# Patient Record
Sex: FEMALE | Race: WHITE | NOT HISPANIC OR LATINO | Employment: FULL TIME | URBAN - METROPOLITAN AREA
[De-identification: names, ages, dates, MRNs, and addresses within clinical notes are randomized per-mention and may not be internally consistent; named-entity substitution may affect disease eponyms.]

---

## 2019-06-14 LAB
PAP DATE - QUEST: NORMAL
PAP: NORMAL

## 2019-07-08 LAB — HIV 1+2 AB+HIV1 P24 AG SERPL QL IA: NONREACTIVE

## 2021-06-26 NOTE — PROGRESS NOTES
ASSESSMENT AND PLAN:     COUNSELING:   Reviewed preventive health counseling, as reflected in patient instructions       Regular exercise       Healthy diet/nutrition       Consider Hep C screening for all patients one time for ages 18-79 years       HIV screeninx in teen years, 1x in adult years, and at intervals if high risk      (Z01.419) Well woman exam  (primary encounter diagnosis)  Comment: Age appropriate screening and preventive services provided.   Plan:     (Z30.431) Intrauterine device surveillance  Comment: Satisfied with Harriet  Plan:     (F41.9) Anxiety  Comment: Well controlled, stable. Refilled medications.   Plan: sertraline (ZOLOFT) 100 MG tablet, traZODone         (DESYREL) 50 MG tablet, propranolol (INDERAL)         10 MG tablet          (L70.0) Acne vulgaris  Comment: Not at goal. Has tolerated spironolactone and tretinoin 0.05% topical. Had normal chemistries in October on spironolactone 50mg. Has IUD for contraception. Increase spironolactone to 100mg daily and tretinoin to 0.1% if skin will tolerate.   Plan: spironolactone (ALDACTONE) 100 MG tablet,         tretinoin (RETIN-A) 0.1 % external cream          Mann Chau MD  [facility]  2021, 5:07 PM      SUBJECTIVE:   Naomy is a 26 year old female who presents to clinic today to establish care and for annual wellness exam.    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 No dizziness/lightheadedness  - tretinoin 0.05% at night, BP daily, clindamycin gel daily    -  10/13/20 Creatinine 0.98, BUN 17, Na 139, K 3.8     # Health Maintenance  - HIV Screenin19 nonreactive  - Hep C Screening: do today  - BP:   BP Readings from Last 3 Encounters:   21 104/70   - Cholesterol: do with next labs, has never had checked  - Diabetes Screening: random glucose 67 on 10/13/20  - Feels safe at home, denies verbal/physical/emotional abuse in past year: yes  - Last Pap: 19 NIL  - Exercise: runs, bikes, lift weights, walks regularly, 4 days a week does something    Today's PHQ-2 Score:   PHQ-2 (  [employer]) 2021   Q1: Little interest or pleasure in doing things 0   Q2: Feeling down, depressed or hopeless 0   PHQ-2 Score 0     Review of Systems:   Constitutional - no fevers, chills, night sweats, unintentional weight loss/gain   Eyes - no vision concerns   Ears/Nose/Throat - no hearing concerns, no dysphagia/odynophagia   Cardiovascular - no chest pain, palpitations   Pulmonary - no shortness of breath, wheezing, coughing   GI - no abdominal pain, constipation, diarrhea, nausea, vomiting    - no dysuria, polyuria, hematuria   Musculoskeletal - no joint or muscle pain  Integument - as above   Neuro - no weakness, numbness, paresthesia   Heme - no easy bruising/bleeding   Endocrine - no polyuria, weight loss/gain, dry skin, excessive sweating, hair loss   Psychiatric - no feelings of depressed mood or anhedonia in past 2 weeks   Allergic/Immunologic - no history of anaphylaxis, no history of recurrent infections    Past Medical History:   Diagnosis Date     Acne      Anxiety      History of heavy periods      History reviewed. No pertinent surgical history.  Family History   Problem Relation Age of Onset     No Known Problems Mother      No Known Problems Father      No Known Problems Sister      No Known Problems Brother      Cerebrovascular Disease Maternal Grandmother 70     Lymphoma Maternal Grandfather         NHL     No Known Problems Paternal Grandmother      ALS  "Paternal Grandfather 65     Diabetes No family hx of      Heart Disease No family hx of      Breast Cancer No family hx of      Colon Cancer No family hx of      Ovarian Cancer No family hx of      Social History     Tobacco Use     Smoking status: Never Smoker     Smokeless tobacco: Never Used   Substance Use Topics     Alcohol use: Yes     Frequency: 2-4 times a month     Drinks per session: 1 or 2     Binge frequency: Never     Drug use: Never     Social History     Social History Narrative    Lives with  who is ENT resident    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      I have reviewed the patient's past medical, surgical, family, and social history.     OBJECTIVE:   /70 (BP Location: Left arm, Patient Position: Sitting, Cuff Size: Adult Regular)   Pulse 81   Temp 96.3  F (35.7  C) (Skin)   Resp 13   Ht 1.76 m (5' 9.29\")   Wt 58.6 kg (129 lb 4 oz)   LMP  (LMP Unknown)   SpO2 97%   Breastfeeding No   BMI 18.93 kg/m      Constitutional: well-appearing, appears stated age  Eyes: conjunctivae without erythema, sclera anicteric. Pupils equal, round, and reactive to light.   ENT: oropharynx clear, TM grey bilateral  Cardiac: regular rate and rhythm, normal S1/S2, no murmur/rubs/gallops  Respiratory: lungs clear to auscultation bilaterally, normal work of breathing, no wheezes/crackles  GI: abdomen soft, non-tender, non-distended  Extremities: warm and well perfused, radial pulses 2+ and equal, cap refill brisk.  Lymph: no cervical or supraclavicular lymphadenopathy  Skin: no rashes, lesions, or " wounds  Psych: affect is full and appropriate, speech is fluent and non-pressured

## 2021-07-02 ENCOUNTER — OFFICE VISIT (OUTPATIENT)
Dept: FAMILY MEDICINE | Facility: CLINIC | Age: 26
End: 2021-07-02
Payer: COMMERCIAL

## 2021-07-02 VITALS
BODY MASS INDEX: 19.14 KG/M2 | RESPIRATION RATE: 13 BRPM | OXYGEN SATURATION: 97 % | SYSTOLIC BLOOD PRESSURE: 104 MMHG | WEIGHT: 129.25 LBS | DIASTOLIC BLOOD PRESSURE: 70 MMHG | HEIGHT: 69 IN | TEMPERATURE: 96.3 F | HEART RATE: 81 BPM

## 2021-07-02 DIAGNOSIS — L70.0 ACNE VULGARIS: ICD-10-CM

## 2021-07-02 DIAGNOSIS — Z01.419 WELL WOMAN EXAM: Primary | ICD-10-CM

## 2021-07-02 DIAGNOSIS — Z30.431 INTRAUTERINE DEVICE SURVEILLANCE: ICD-10-CM

## 2021-07-02 DIAGNOSIS — F41.9 ANXIETY: ICD-10-CM

## 2021-07-02 PROBLEM — H52.203 MYOPIA OF BOTH EYES WITH ASTIGMATISM: Chronic | Status: ACTIVE | Noted: 2019-08-28

## 2021-07-02 PROBLEM — H52.13 MYOPIA OF BOTH EYES WITH ASTIGMATISM: Status: ACTIVE | Noted: 2019-08-28

## 2021-07-02 PROBLEM — H50.21 HYPERTROPIA OF RIGHT EYE: Status: ACTIVE | Noted: 2019-08-28

## 2021-07-02 PROBLEM — H52.203 MYOPIA OF BOTH EYES WITH ASTIGMATISM: Status: ACTIVE | Noted: 2019-08-28

## 2021-07-02 PROBLEM — H50.21 HYPERTROPIA OF RIGHT EYE: Chronic | Status: ACTIVE | Noted: 2019-08-28

## 2021-07-02 PROBLEM — H52.13 MYOPIA OF BOTH EYES WITH ASTIGMATISM: Chronic | Status: ACTIVE | Noted: 2019-08-28

## 2021-07-02 RX ORDER — PROPRANOLOL HYDROCHLORIDE 10 MG/1
10 TABLET ORAL PRN
COMMUNITY
Start: 2020-09-25 | End: 2021-07-02

## 2021-07-02 RX ORDER — SERTRALINE HYDROCHLORIDE 100 MG/1
100 TABLET, FILM COATED ORAL DAILY
COMMUNITY
Start: 2021-01-28 | End: 2021-07-02

## 2021-07-02 RX ORDER — TRAZODONE HYDROCHLORIDE 50 MG/1
25 TABLET, FILM COATED ORAL AT BEDTIME
COMMUNITY
Start: 2020-09-25 | End: 2021-07-02

## 2021-07-02 RX ORDER — PROPRANOLOL HYDROCHLORIDE 10 MG/1
10 TABLET ORAL PRN
Qty: 30 TABLET | Refills: 11 | Status: SHIPPED | OUTPATIENT
Start: 2021-07-02 | End: 2022-10-31

## 2021-07-02 RX ORDER — TRETINOIN 1 MG/G
CREAM TOPICAL AT BEDTIME
Qty: 45 G | Refills: 11 | Status: SHIPPED | OUTPATIENT
Start: 2021-07-02 | End: 2022-01-25

## 2021-07-02 RX ORDER — SPIRONOLACTONE 100 MG/1
100 TABLET, FILM COATED ORAL DAILY
Qty: 90 TABLET | Refills: 3 | Status: SHIPPED | OUTPATIENT
Start: 2021-07-02 | End: 2022-07-22

## 2021-07-02 RX ORDER — SPIRONOLACTONE 50 MG/1
50 TABLET, FILM COATED ORAL DAILY
COMMUNITY
Start: 2021-01-28 | End: 2021-07-02

## 2021-07-02 RX ORDER — TRETINOIN 0.5 MG/G
CREAM TOPICAL AT BEDTIME
COMMUNITY
Start: 2021-01-04 | End: 2021-07-02

## 2021-07-02 RX ORDER — TRAZODONE HYDROCHLORIDE 50 MG/1
25 TABLET, FILM COATED ORAL AT BEDTIME
Qty: 45 TABLET | Refills: 3 | Status: SHIPPED | OUTPATIENT
Start: 2021-07-02 | End: 2022-07-06

## 2021-07-02 RX ORDER — CLINDAMYCIN PHOSPHATE 10 MG/G
GEL TOPICAL DAILY
COMMUNITY
Start: 2021-01-04 | End: 2022-07-22

## 2021-07-02 RX ORDER — SERTRALINE HYDROCHLORIDE 100 MG/1
100 TABLET, FILM COATED ORAL DAILY
Qty: 90 TABLET | Refills: 3 | Status: SHIPPED | OUTPATIENT
Start: 2021-07-02 | End: 2022-07-06

## 2021-07-02 SDOH — HEALTH STABILITY: MENTAL HEALTH: HOW OFTEN DO YOU HAVE 6 OR MORE DRINKS ON ONE OCCASION?: NEVER

## 2021-07-02 SDOH — HEALTH STABILITY: MENTAL HEALTH: HOW MANY STANDARD DRINKS CONTAINING ALCOHOL DO YOU HAVE ON A TYPICAL DAY?: 1 OR 2

## 2021-07-02 SDOH — HEALTH STABILITY: MENTAL HEALTH: HOW OFTEN DO YOU HAVE A DRINK CONTAINING ALCOHOL?: 2-4 TIMES A MONTH

## 2021-07-02 ASSESSMENT — ANXIETY QUESTIONNAIRES
7. FEELING AFRAID AS IF SOMETHING AWFUL MIGHT HAPPEN: NOT AT ALL
1. FEELING NERVOUS, ANXIOUS, OR ON EDGE: NOT AT ALL
GAD7 TOTAL SCORE: 0
6. BECOMING EASILY ANNOYED OR IRRITABLE: NOT AT ALL
4. TROUBLE RELAXING: NOT AT ALL
3. WORRYING TOO MUCH ABOUT DIFFERENT THINGS: NOT AT ALL
5. BEING SO RESTLESS THAT IT IS HARD TO SIT STILL: NOT AT ALL
2. NOT BEING ABLE TO STOP OR CONTROL WORRYING: NOT AT ALL

## 2021-07-02 ASSESSMENT — PATIENT HEALTH QUESTIONNAIRE - PHQ9: SUM OF ALL RESPONSES TO PHQ QUESTIONS 1-9: 0

## 2021-07-02 ASSESSMENT — MIFFLIN-ST. JEOR: SCORE: 1385.26

## 2021-07-02 NOTE — NURSING NOTE
"26 year old  Chief Complaint   Patient presents with     Anxiety     Physical       Blood pressure 104/70, pulse 81, temperature 96.3  F (35.7  C), temperature source Skin, resp. rate 13, height 1.76 m (5' 9.29\"), weight 58.6 kg (129 lb 4 oz), SpO2 97 %, not currently breastfeeding. Body mass index is 18.93 kg/m .  There is no problem list on file for this patient.      Wt Readings from Last 2 Encounters:   07/02/21 58.6 kg (129 lb 4 oz)     BP Readings from Last 3 Encounters:   07/02/21 104/70         Current Outpatient Medications   Medication     clindamycin (CLINDAMAX) 1 % external gel     levonorgestrel (DEBORAH) 13.5 MG IUD     propranolol (INDERAL) 10 MG tablet     sertraline (ZOLOFT) 100 MG tablet     spironolactone (ALDACTONE) 50 MG tablet     traZODone (DESYREL) 50 MG tablet     tretinoin (RETIN-A) 0.05 % external cream     No current facility-administered medications for this visit.        Social History     Tobacco Use     Smoking status: Never Smoker     Smokeless tobacco: Never Used   Substance Use Topics     Alcohol use: Yes     Frequency: 2-4 times a month     Drinks per session: 1 or 2     Binge frequency: Never     Drug use: Never       Health Maintenance Due   Topic Date Due     PREVENTIVE CARE VISIT  Never done     ADVANCE CARE PLANNING  Never done     HIV SCREENING  Never done     HEPATITIS C SCREENING  Never done     PAP  Never done       No results found for: PAP      July 2, 2021 3:55 PM    "

## 2021-07-03 ASSESSMENT — ANXIETY QUESTIONNAIRES: GAD7 TOTAL SCORE: 0

## 2021-10-11 ENCOUNTER — HEALTH MAINTENANCE LETTER (OUTPATIENT)
Age: 26
End: 2021-10-11

## 2021-12-07 ENCOUNTER — OFFICE VISIT (OUTPATIENT)
Dept: OBGYN | Facility: CLINIC | Age: 26
End: 2021-12-07
Attending: ADVANCED PRACTICE MIDWIFE
Payer: COMMERCIAL

## 2021-12-07 VITALS
BODY MASS INDEX: 19.92 KG/M2 | SYSTOLIC BLOOD PRESSURE: 109 MMHG | HEART RATE: 75 BPM | HEIGHT: 69 IN | WEIGHT: 134.5 LBS | DIASTOLIC BLOOD PRESSURE: 72 MMHG

## 2021-12-07 DIAGNOSIS — Z30.432 ENCOUNTER FOR IUD REMOVAL: ICD-10-CM

## 2021-12-07 DIAGNOSIS — Z30.432 ENCOUNTER FOR REMOVAL OF INTRAUTERINE CONTRACEPTIVE DEVICE: ICD-10-CM

## 2021-12-07 DIAGNOSIS — Z00.00 ANNUAL PHYSICAL EXAM: Primary | ICD-10-CM

## 2021-12-07 DIAGNOSIS — Z30.011 ENCOUNTER FOR INITIAL PRESCRIPTION OF CONTRACEPTIVE PILLS: ICD-10-CM

## 2021-12-07 PROBLEM — Z30.431 INTRAUTERINE DEVICE SURVEILLANCE: Chronic | Status: RESOLVED | Noted: 2021-07-02 | Resolved: 2021-12-07

## 2021-12-07 PROCEDURE — G0101 CA SCREEN;PELVIC/BREAST EXAM: HCPCS | Performed by: ADVANCED PRACTICE MIDWIFE

## 2021-12-07 PROCEDURE — 58301 REMOVE INTRAUTERINE DEVICE: CPT | Performed by: ADVANCED PRACTICE MIDWIFE

## 2021-12-07 PROCEDURE — G0463 HOSPITAL OUTPT CLINIC VISIT: HCPCS

## 2021-12-07 RX ORDER — NORETHINDRONE ACETATE AND ETHINYL ESTRADIOL .03; 1.5 MG/1; MG/1
1 TABLET ORAL DAILY
Qty: 112 TABLET | Refills: 4 | Status: SHIPPED | OUTPATIENT
Start: 2021-12-07 | End: 2023-01-16

## 2021-12-07 ASSESSMENT — ANXIETY QUESTIONNAIRES
6. BECOMING EASILY ANNOYED OR IRRITABLE: NOT AT ALL
5. BEING SO RESTLESS THAT IT IS HARD TO SIT STILL: NOT AT ALL
3. WORRYING TOO MUCH ABOUT DIFFERENT THINGS: NOT AT ALL
1. FEELING NERVOUS, ANXIOUS, OR ON EDGE: NOT AT ALL
2. NOT BEING ABLE TO STOP OR CONTROL WORRYING: NOT AT ALL
7. FEELING AFRAID AS IF SOMETHING AWFUL MIGHT HAPPEN: NOT AT ALL
GAD7 TOTAL SCORE: 0

## 2021-12-07 ASSESSMENT — MIFFLIN-ST. JEOR: SCORE: 1404.47

## 2021-12-07 ASSESSMENT — PATIENT HEALTH QUESTIONNAIRE - PHQ9: 5. POOR APPETITE OR OVEREATING: NOT AT ALL

## 2021-12-07 NOTE — PROGRESS NOTES
CC/HPI:   Naomy Mark is a 26 year old female  who presents today for her annual exam. She is currently using Sklya IUD  and would like to change this method of birth control to a combined oral contraceptive. Has had Harriet IUD for 2 years and Mirena IUD for five years prior. Before using IUD contraception she used combined oral contraceptives. She would like to switch back to using combined oral contraceptives to help with acne, states that her acne has gotten worse with progesterone only contraception. Would like IUD removed today and be started on combined oral contraception. Contraindications discussed with patient, no contraindications noted for starting combined oral contraception.      Has had regular menstrual cycles with Harriet IUD with 3-4 days of light bleeding. Reports having had heavy menstrual periods before starting hormonal contraception.     Sexually active, in a monogamous relationship with her . Declines STI testing today. Planning on conceiving in the next in 1-2 years. Encouraged to start taking prenatal vitamin.       Reports lump on right breast, would like to have it examined today. Has not noticed any change in size.     Reports no new concerns or changes with mood.     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     HISTORIES:  Patient Active Problem List   Diagnosis     Anxiety     Hypertropia of right eye     Myopia of both eyes with astigmatism     Acne vulgaris     Past Medical History:   Diagnosis Date     Acne      Anxiety      History of heavy periods      History reviewed. No pertinent surgical history.  Current Outpatient Medications   Medication Sig Dispense Refill     clindamycin (CLINDAMAX) 1 % external gel Apply topically daily       norethindrone-ethinyl estradiol (MICROGESTIN 1.5/30) 1.5-30 MG-MCG tablet Take 1 tablet by mouth daily 112 tablet 4     sertraline (ZOLOFT) 100 MG tablet Take 1 tablet (100 mg) by mouth daily 90  tablet 3     spironolactone (ALDACTONE) 100 MG tablet Take 1 tablet (100 mg) by mouth daily 90 tablet 3     traZODone (DESYREL) 50 MG tablet Take 0.5 tablets (25 mg) by mouth At Bedtime 45 tablet 3     tretinoin (RETIN-A) 0.1 % external cream Apply topically At Bedtime 45 g 11     propranolol (INDERAL) 10 MG tablet Take 1 tablet (10 mg) by mouth as needed (anxiety) (Patient not taking: Reported on 12/7/2021) 30 tablet 11     No Known Allergies  Social History     Socioeconomic History     Marital status:      Spouse name: Not on file     Number of children: Not on file     Years of education: Not on file     Highest education level: Not on file   Occupational History     Not on file   Tobacco Use     Smoking status: Never Smoker     Smokeless tobacco: Never Used   Substance and Sexual Activity     Alcohol use: Yes     Drug use: Never     Sexual activity: Yes     Partners: Male     Birth control/protection: I.U.D.     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     Social Determinants of Health     Financial Resource Strain: Not on file   Food Insecurity: Not on file   Transportation Needs: Not on file   Physical Activity: Not on file   Stress: Not on file   Social Connections: Not on file   Intimate Partner Violence: Not on file   Housing Stability: Not on file     Family History   Problem Relation Age of Onset     No Known Problems Mother      No Known Problems Father      No Known Problems Sister      No Known Problems Brother      Cerebrovascular Disease Maternal Grandmother 70     Lymphoma Maternal Grandfather         NHL     No Known Problems Paternal Grandmother      ALS Paternal Grandfather 65     Diabetes No family hx of      Heart Disease No family hx of      Breast Cancer No family hx of      Colon Cancer No family hx of      Ovarian Cancer No family hx  "of           Gyn Hx:   IUD   Menses:   Has had regular menstrual cycles with Harriet IUD with 3-4 days of light bleeding. Reports having had heavy menstrual periods before starting hormonal contraception.       Review Of Systems:  CONSTITUTIONAL: NEGATIVE for fever, chills  EYES: NEGATIVE for vision changes   RESP: NEGATIVE for significant cough or SOB  CV: NEGATIVE for chest pain, palpitations   GI: NEGATIVE for nausea, abdominal pain, heartburn, or change in bowel habits  : NEGATIVE for frequency, dysuria, or hematuria  MUSCULOSKELETAL: NEGATIVE for significant arthralgias or myalgia  NEURO: NEGATIVE for weakness, dizziness or paresthesias or headache    EXAM:  /72 (BP Location: Left arm, Patient Position: Chair)   Pulse 75   Ht 1.753 m (5' 9\")   Wt 61 kg (134 lb 8 oz)   BMI 19.86 kg/m    Body mass index is 19.86 kg/m .    General - pleasant female in no acute distress.  Skin - no suspicious lesions or rashes  EENT-  PERRLA, euthyroid with out palpable nodules  Neck - supple without lymphadenopathy.  Lungs - clear to auscultation bilaterally.  Heart - regular rate and rhythm without murmur.  Breasts- symmetrical . No dominant fixed or suspicious masses noted.  No skin or nipple changes or axillary nodes. Self exam is taught and encouraged monthly.  Fibrious tissue present bilaterally on superior lateral areas of breasts.   Abdomen - soft, nontender, nondistended, no masses or organomegaly noted.  Musculoskeletal - no gross deformities.  Neurological - normal strength, sensation, and mental status.  Pelvic - EG: normal  female, vulva reveals no erythema or lesions.   BUS: within normal limits.  Vagina: well rugated, no lesions polyps or suspicious  discharge.     Cervix: no lesions, polyps discharge or CMT. Nabothian cysts present on cervix.   Anus- normal, no lesions.  Rectovaginal - deferred.            ASSESSMENT/PLAN  No diagnosis found.    Additional teaching done at this visit regarding self breast " exam, birth control and preconception. I have discussed with patient the harms and  benefits of combined oral contraception medications, treatment options.       IUD Removal:  SUBJECTIVE:    Is a pregnancy test required: No.  Was a consent obtained?  Yes    Naomy Mark is a 26 year old female,, No LMP recorded. (Menstrual status: IUD). who presents today for IUD removal. Her current IUD was placed 2 years ago. She has not had problems with the IUD. She requests removal of the IUD because she wants to change her method of contraception    Today's PHQ-2 Score:   PHQ-2 (  [employer]) 2021   Q1: Little interest or pleasure in doing things 0   Q2: Feeling down, depressed or hopeless 0   PHQ-2 Score 0   PHQ-2 Total Score (12-17 Years)- Positive if 3 or more points; Administer PHQ-A if positive -       PROCEDURE:    A speculum exam was performed and the cervix was visualized. The IUD string was visualized. Using ring forceps, the string  was grasped and the IUD removed intact.    POST PROCEDURE:    The patient tolerated the procedure well. Patient was discharged in stable condition.    Call if bleeding, pain or fever occur., Birth control counseling given., Pregnancy counseling given, including folic acid supplementation 800-1000 mg per day. and Use of condoms/foam discussed.    Return to clinic in one year for annual exam or PRN if questions or concerns.     MARGARITO Gutiérrez CNM

## 2021-12-07 NOTE — LETTER
12/7/2021       RE: Naomy Mark  [address]  [address]  [place] [address]     Dear Colleague,    Thank you for referring your patient, Naomy Mark, to the [facility] [place] at [facility]. Please see a copy of my visit note below.    CC/HPI:   Naomy Mark is a 26 year old female  who presents today for her annual exam. She is currently using Sklya IUD  and would like to change this method of birth control to a combined oral contraceptive. Has had Harriet IUD for 2 years and Mirena IUD for five years prior. Before using IUD contraception she used combined oral contraceptives. She would like to switch back to using combined oral contraceptives to help with acne, states that her acne has gotten worse with progesterone only contraception. Would like IUD removed today and be started on combined oral contraception. Contraindications discussed with patient, no contraindications noted for starting combined oral contraception.      Has had regular menstrual cycles with Harriet IUD with 3-4 days of light bleeding. Reports having had heavy menstrual periods before starting hormonal contraception.     Sexually active, in a monogamous relationship with her . Declines STI testing today. Planning on conceiving in the next in 1-2 years. Encouraged to start taking prenatal vitamin.       Reports lump on right breast, would like to have it examined today. Has not noticed any change in size.     Reports no new concerns or changes with mood.     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     HISTORIES:  Patient Active Problem List   Diagnosis     Anxiety     Hypertropia of right eye     Myopia of both eyes with astigmatism     Acne vulgaris     Past Medical History:   Diagnosis Date     Acne      Anxiety      History of heavy periods      History reviewed. No pertinent surgical history.  Current  Outpatient Medications   Medication Sig Dispense Refill     clindamycin (CLINDAMAX) 1 % external gel Apply topically daily       norethindrone-ethinyl estradiol (MICROGESTIN 1.5/30) 1.5-30 MG-MCG tablet Take 1 tablet by mouth daily 112 tablet 4     sertraline (ZOLOFT) 100 MG tablet Take 1 tablet (100 mg) by mouth daily 90 tablet 3     spironolactone (ALDACTONE) 100 MG tablet Take 1 tablet (100 mg) by mouth daily 90 tablet 3     traZODone (DESYREL) 50 MG tablet Take 0.5 tablets (25 mg) by mouth At Bedtime 45 tablet 3     tretinoin (RETIN-A) 0.1 % external cream Apply topically At Bedtime 45 g 11     propranolol (INDERAL) 10 MG tablet Take 1 tablet (10 mg) by mouth as needed (anxiety) (Patient not taking: Reported on 12/7/2021) 30 tablet 11     No Known Allergies  Social History     Socioeconomic History     Marital status:      Spouse name: Not on file     Number of children: Not on file     Years of education: Not on file     Highest education level: Not on file   Occupational History     Not on file   Tobacco Use     Smoking status: Never Smoker     Smokeless tobacco: Never Used   Substance and Sexual Activity     Alcohol use: Yes     Drug use: Never     Sexual activity: Yes     Partners: Male     Birth control/protection: I.U.D.     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     Social Determinants of Health     Financial Resource Strain: Not on file   Food Insecurity: Not on file   Transportation Needs: Not on file   Physical Activity: Not on file   Stress: Not on file   Social Connections: Not on file   Intimate Partner Violence: Not on file   Housing Stability: Not on file     Family History   Problem Relation Age of Onset     No Known Problems Mother      No Known Problems Father      No Known Problems Sister      No Known Problems Brother       "Cerebrovascular Disease Maternal Grandmother 70     Lymphoma Maternal Grandfather         NHL     No Known Problems Paternal Grandmother      ALS Paternal Grandfather 65     Diabetes No family hx of      Heart Disease No family hx of      Breast Cancer No family hx of      Colon Cancer No family hx of      Ovarian Cancer No family hx of           Gyn Hx:   IUD   Menses:   Has had regular menstrual cycles with Harriet IUD with 3-4 days of light bleeding. Reports having had heavy menstrual periods before starting hormonal contraception.       Review Of Systems:  CONSTITUTIONAL: NEGATIVE for fever, chills  EYES: NEGATIVE for vision changes   RESP: NEGATIVE for significant cough or SOB  CV: NEGATIVE for chest pain, palpitations   GI: NEGATIVE for nausea, abdominal pain, heartburn, or change in bowel habits  : NEGATIVE for frequency, dysuria, or hematuria  MUSCULOSKELETAL: NEGATIVE for significant arthralgias or myalgia  NEURO: NEGATIVE for weakness, dizziness or paresthesias or headache    EXAM:  /72 (BP Location: Left arm, Patient Position: Chair)   Pulse 75   Ht 1.753 m (5' 9\")   Wt 61 kg (134 lb 8 oz)   BMI 19.86 kg/m    Body mass index is 19.86 kg/m .    General - pleasant female in no acute distress.  Skin - no suspicious lesions or rashes  EENT-  PERRLA, euthyroid with out palpable nodules  Neck - supple without lymphadenopathy.  Lungs - clear to auscultation bilaterally.  Heart - regular rate and rhythm without murmur.  Breasts- symmetrical . No dominant fixed or suspicious masses noted.  No skin or nipple changes or axillary nodes. Self exam is taught and encouraged monthly.  Fibrious tissue present bilaterally on superior lateral areas of breasts.   Abdomen - soft, nontender, nondistended, no masses or organomegaly noted.  Musculoskeletal - no gross deformities.  Neurological - normal strength, sensation, and mental status.  Pelvic - EG: normal  female, vulva reveals no erythema or lesions.   BUS: " within normal limits.  Vagina: well rugated, no lesions polyps or suspicious  discharge.     Cervix: no lesions, polyps discharge or CMT. Nabothian cysts present on cervix.   Anus- normal, no lesions.  Rectovaginal - deferred.            ASSESSMENT/PLAN  No diagnosis found.    Additional teaching done at this visit regarding self breast exam, birth control and preconception. I have discussed with patient the harms and  benefits of combined oral contraception medications, treatment options.       IUD Removal:  SUBJECTIVE:    Is a pregnancy test required: No.  Was a consent obtained?  Yes    Naomy Mark is a 26 year old female,, No LMP recorded. (Menstrual status: IUD). who presents today for IUD removal. Her current IUD was placed 2 years ago. She has not had problems with the IUD. She requests removal of the IUD because she wants to change her method of contraception    Today's PHQ-2 Score:   PHQ-2 (  [employer]) 2021   Q1: Little interest or pleasure in doing things 0   Q2: Feeling down, depressed or hopeless 0   PHQ-2 Score 0   PHQ-2 Total Score (12-17 Years)- Positive if 3 or more points; Administer PHQ-A if positive -       PROCEDURE:    A speculum exam was performed and the cervix was visualized. The IUD string was visualized. Using ring forceps, the string  was grasped and the IUD removed intact.    POST PROCEDURE:    The patient tolerated the procedure well. Patient was discharged in stable condition.    Call if bleeding, pain or fever occur., Birth control counseling given., Pregnancy counseling given, including folic acid supplementation 800-1000 mg per day. and Use of condoms/foam discussed.    Return to clinic in one year for annual exam or PRN if questions or concerns.     MARGARITO Gutiérrez CNM

## 2021-12-08 ASSESSMENT — ANXIETY QUESTIONNAIRES: GAD7 TOTAL SCORE: 0

## 2022-01-05 ENCOUNTER — OFFICE VISIT (OUTPATIENT)
Dept: OPTOMETRY | Facility: CLINIC | Age: 27
End: 2022-01-05
Payer: COMMERCIAL

## 2022-01-05 DIAGNOSIS — H52.13 MYOPIA OF BOTH EYES: ICD-10-CM

## 2022-01-05 DIAGNOSIS — H50.21 HYPERTROPIA OF RIGHT EYE: Primary | ICD-10-CM

## 2022-01-05 ASSESSMENT — REFRACTION_CURRENTRX
OS_SPHERE: -3.25
OD_SPHERE: -1.75

## 2022-01-05 ASSESSMENT — REFRACTION_WEARINGRX
OD_CYLINDER: +0.75
OS_VPRISM: 1
OD_VBASE: DOWN
OD_SPHERE: -2.25
OS_SPHERE: -3.50
OD_AXIS: 172
OS_AXIS: 115
OS_CYLINDER: +0.25
OD_VPRISM: 1
OS_VBASE: UP

## 2022-01-05 ASSESSMENT — VISUAL ACUITY
CORRECTION_TYPE: GLASSES
OD_CC: 20/20
METHOD: SNELLEN - LINEAR
OS_CC: 20/20

## 2022-01-05 ASSESSMENT — TONOMETRY
OS_IOP_MMHG: 17
OD_IOP_MMHG: 16
IOP_METHOD: ICARE

## 2022-01-05 ASSESSMENT — REFRACTION_MANIFEST
OS_SPHERE: -3.25
OD_AXIS: 175
OD_SPHERE: -2.25
OS_CYLINDER: SPHERE
OD_CYLINDER: +0.50

## 2022-01-05 ASSESSMENT — REFRACTION_FINALRX
OS_VPRISM: 1 UP
OD_VPRISM: 1 DOWN

## 2022-01-05 ASSESSMENT — CUP TO DISC RATIO
OD_RATIO: 0.40
OS_RATIO: 0.40

## 2022-01-05 ASSESSMENT — SLIT LAMP EXAM - LIDS
COMMENTS: NORMAL
COMMENTS: NORMAL

## 2022-01-05 ASSESSMENT — CONF VISUAL FIELD
OD_NORMAL: 1
OS_NORMAL: 1

## 2022-01-05 ASSESSMENT — EXTERNAL EXAM - RIGHT EYE: OD_EXAM: NORMAL

## 2022-01-05 ASSESSMENT — EXTERNAL EXAM - LEFT EYE: OS_EXAM: NORMAL

## 2022-01-05 NOTE — Clinical Note
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 Very interesting!

## 2022-01-05 NOTE — PROGRESS NOTES
A/P  1.) Right hypertropia  -Longstanding, does well with 2D vertical prism in glasses  -Previously eval'd by strabismus surgeon [facility] and determined to not be a candidate  -Current part-time CL wear for exercise/social events - can fuse/function but gets strained and HA with any extended viewing tasks  -Fuses well with 2-3pd base down right eye  -Reviewed options and expectations with pt - I would recommend starting with custom soft lenses with base down prism in the right eye    Order prism CL's and mail to pt. Can f/u prn with lens concerns.

## 2022-01-17 ENCOUNTER — DOCUMENTATION ONLY (OUTPATIENT)
Dept: OPTOMETRY | Facility: CLINIC | Age: 27
End: 2022-01-17
Payer: COMMERCIAL

## 2022-04-12 NOTE — TELEPHONE ENCOUNTER
RECORDS RECEIVED FROM: Internal   REASON FOR VISIT: Headaches   Date of Appt: 04/29/2022   NOTES (FOR ALL VISITS) STATUS DETAILS   OFFICE NOTE from referring provider N/A    OFFICE NOTE from other specialist N/A    DISCHARGE SUMMARY from hospital N/A    DISCHARGE REPORT from the ER N/A    OPERATIVE REPORT N/A    MEDICATION LIST N/A    IMAGING  (FOR ALL VISITS)     EMG N/A    EEG N/A    LUMBAR PUNCTURE N/A    ANITA SCAN N/A    ULTRASOUND (CAROTID BILAT) *VASCULAR* N/A    MRI (HEAD, NECK, SPINE) N/A    CT (HEAD, NECK, SPINE) N/A         LVM to discuss records.   Nat Patel on 4/12/2022 at 2:14 PM    2nd attempt to reach patient. No answer. No VM left.  Nat Patel on 4/19/2022 at 11:31 AM

## 2022-04-27 ENCOUNTER — OFFICE VISIT (OUTPATIENT)
Dept: OPTOMETRY | Facility: CLINIC | Age: 27
End: 2022-04-27
Payer: COMMERCIAL

## 2022-04-27 DIAGNOSIS — H52.13 MYOPIA OF BOTH EYES: ICD-10-CM

## 2022-04-27 DIAGNOSIS — H50.21 HYPERTROPIA OF RIGHT EYE: Primary | ICD-10-CM

## 2022-04-27 ASSESSMENT — REFRACTION_WEARINGRX
OD_SPHERE: -2.25
OD_CYLINDER: +0.50
OS_VPRISM: 1 UP
OD_VBASE: DOWN
OS_CYLINDER: SPHERE
OS_VBASE: UP
OD_AXIS: 175
OD_VPRISM: 1 DOWN
OS_SPHERE: -3.25

## 2022-04-27 ASSESSMENT — VISUAL ACUITY
OD_CC: 20/20
CORRECTION_TYPE: GLASSES
METHOD: SNELLEN - LINEAR
OS_CC: 20/20

## 2022-04-27 ASSESSMENT — REFRACTION_CURRENTRX
OS_SPHERE: PLANO
OD_BRAND: CUSTOM STABLE
OD_AXIS: 080
OS_BASECURVE: 8.7
OS_DIAMETER: 14.8
OD_DIAMETER: 14.8
OS_BRAND: SPECIALEYES
OD_BRAND: SPECIALEYES
OS_BASECURVE: 8.23
OS_SPHERE: -3.25
OD_BASECURVE: 7.85
OD_SPHERE: -2.00
OD_CYLINDER: -0.25
OS_BRAND: CUSTOM STABLE
OD_SPHERE: -1.75
OD_DIAMETER: 15.8
OD_BASECURVE: 8.6
OS_DIAMETER: 15.8

## 2022-04-27 ASSESSMENT — REFRACTION_MANIFEST
OD_AXIS: 175
OD_SPHERE: -2.25
OS_SPHERE: -3.25
OD_CYLINDER: +0.50

## 2022-04-27 ASSESSMENT — SLIT LAMP EXAM - LIDS
COMMENTS: NORMAL
COMMENTS: NORMAL

## 2022-04-27 ASSESSMENT — EXTERNAL EXAM - RIGHT EYE: OD_EXAM: NORMAL

## 2022-04-27 ASSESSMENT — EXTERNAL EXAM - LEFT EYE: OS_EXAM: NORMAL

## 2022-04-28 NOTE — PROGRESS NOTES
A/P  1.) Right hypertropia  -Longstanding, does well with 2D vertical prism in glasses  -Previously eval'd by strabismus surgeon [facility] and determined to not be a candidate  -Current part-time CL wear for exercise/social events - can fuse/function but gets strained and HA with any extended viewing tasks  -Fuses well with 2-3pd base down right eye  -Poor near vision with all soft prism lenses today. This does not improve with plus.   -NRA/PRA testing normal. Similar results with scleral lens trial today (measurements taken)    Per review may consider CI (as she does not have near symptoms in glasses where she is getting BI prism). CL's may not work but to consider all options can consider scleral with vertical and horizontal prism. Will communicate with pt regarding plan going forward

## 2022-04-29 ENCOUNTER — VIRTUAL VISIT (OUTPATIENT)
Dept: NEUROLOGY | Facility: CLINIC | Age: 27
End: 2022-04-29
Payer: COMMERCIAL

## 2022-04-29 ENCOUNTER — PRE VISIT (OUTPATIENT)
Dept: NEUROLOGY | Facility: CLINIC | Age: 27
End: 2022-04-29

## 2022-04-29 DIAGNOSIS — R51.9 HEADACHE DISORDER: Primary | ICD-10-CM

## 2022-04-29 PROCEDURE — 99203 OFFICE O/P NEW LOW 30 MIN: CPT | Mod: 95 | Performed by: PSYCHIATRY & NEUROLOGY

## 2022-04-29 NOTE — LETTER
2022         RE: Naomy Mark  [address]  [address]  [facility] [address]        Dear Colleague,    Thank you for referring your patient, Naomy Mark, to the [facility] NEUROLOGY [facility]. Please see a copy of my visit note below.    Naomy is a 26 year old who is being evaluated via a billable video visit.      How would you like to obtain your AVS? MyChart  If the video visit is dropped, the invitation should be resent by: Send to e-mail at: [email or web address]  Will anyone else be joining your video visit? No      Video Start Time: 8:10  Video-Visit Details    Type of service:  Video Visit    Video End Time: 8:26    Originating Location (pt. Location): Home    Distant Location (provider location):  [facility] NEUROLOGY [facility]     Platform used for Video Visit: Hometica     Neurology History and Physical Exam  [facility]      Patient:Naomy Mark  : 1995  Age: 26 year old  Today's Visit: 2022    History of present illness:   Naomy Mark is a very pleasant 26 year old woman with history of tension headaches who is participating in this virtual visit for evaluation of a new type of headache.  She has had tension headaches over the past year, however during the past 3 months, she has been having a different type of headache.    The new type of headache, starts at night and wakes her up in the morning and usually continues for a day.  They're most often one-sided, sometimes Rt. Side, sometimes Lt and occasionally bilateral. Occasionally has nausea.  It is a sharp pain, severity on average is 6 or 7, but once it got up to 8 in scale of 1-10.  They happen twice a week and then she goes for couple weeks or so without one. She denies an aura.  She has photosensitivity and phono sensitivity.  She denies vision changes, such as blurry vision, dizziness, tingling/numbness or weakness.     Relieving factors: Taking a  nap.  Aggravating factors: Lying down, working out, bending over.  Naomy hasn't paid attention if they worsen with sneezing or coughing.  She is usually taking a Tylenol or Tylenol and ibuprofen together, which doesn't help much.  She takes Zofran which helps with her nausea.    No FH of migraines.     Naomy drinks a cup of coffee a day.  She sleeps 7-8 hrs per night.  She is taking Trazodone.  She switched from IUD to OCP in December. Has been on OCP in high school.   She had bacterial meningitis at age 6 months.   She did not get Covid.     Past Medical History: History of bacterial meningitis at age 6-month, anxiety, acne.    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 She is , no children.     Family History: No family history of migraine.      Current Outpatient Medications   Medication Sig Dispense Refill     clindamycin (CLINDAMAX) 1 % external gel Apply topically daily       norethindrone-ethinyl estradiol (MICROGESTIN 1.5/30) 1.5-30 MG-MCG tablet Take 1 tablet by mouth daily 112 tablet 4     propranolol (INDERAL) 10 MG tablet Take 1 tablet (10 mg) by mouth as needed (anxiety) 30 tablet 11     sertraline (ZOLOFT) 100 MG tablet Take 1 tablet (100 mg) by mouth daily 90 tablet 3     spironolactone (ALDACTONE) 100 MG tablet Take 1 tablet (100 mg) by mouth daily 90 tablet 3     traZODone (DESYREL) 50 MG tablet Take 0.5 tablets (25 mg) by mouth At Bedtime 45 tablet 3     tretinoin (RETIN-A) 0.05 % external cream Apply once daily to acne lesions before bedtime or in the evening. 45 g 6     Exam:    Wt Readings from Last 5 Encounters:   12/07/21 61 kg (134 lb 8 oz)   07/02/21 58.6 kg (129 lb 4 oz)     Alert, awake, NAD, no aphasia or dysarthria, EOMI, face is symmetric, moves upper extremities equally.      Assessment/Plan:     Likely atypical migraine headaches: Naomy denies an aura.  Her headaches are usually one-sided associated with nausea,  photo/phonosensitivity.  They improve with rest and worsen with activity.  Her headaches are likely atypical migraine headaches, however since there are some red flags for increased intracranial pressure, will do a brain MRI wo and w contrast to rule out intracranial lesions.  If that was normal, will consider migraine prophylactic and abortive medications.     - Brain MRI wo and w contrast  - Follow up phone call after MRI        As described above, I met with the patient for 16 minutes and during this time counseling was greater than 50% of the visit time. I spent an additional 20 min on chart review and documentation. This note was created in part by the use of Dragon voice recognition system. Inadvertent grammatical errors and typographical errors may still exist.  Sadaf Ruiz MD

## 2022-04-29 NOTE — PROGRESS NOTES
Naomy is a 26 year old who is being evaluated via a billable video visit.      How would you like to obtain your AVS? MyChart  If the video visit is dropped, the invitation should be resent by: Send to e-mail at: [email or web address]  Will anyone else be joining your video visit? No      Video Start Time: 8:10  Video-Visit Details    Type of service:  Video Visit    Video End Time: 8:26    Originating Location (pt. Location): Home    Distant Location (provider location):  [facility] NEUROLOGY [facility]IC [place]     Platform used for Video Visit: [employer]     Neurology History and Physical Exam  [facility]      Patient:Naomy Mark  : 1995  Age: 26 year old  Today's Visit: 2022    History of present illness:   Naomy Mark is a very pleasant 26 year old woman with history of tension headaches who is participating in this virtual visit for evaluation of a new type of headache.  She has had tension headaches over the past year, however during the past 3 months, she has been having a different type of headache.    The new type of headache, starts at night and wakes her up in the morning and usually continues for a day.  They're most often one-sided, sometimes Rt. Side, sometimes Lt and occasionally bilateral. Occasionally has nausea.  It is a sharp pain, severity on average is 6 or 7, but once it got up to 8 in scale of 1-10.  They happen twice a week and then she goes for couple weeks or so without one. She denies an aura.  She has photosensitivity and phono sensitivity.  She denies vision changes, such as blurry vision, dizziness, tingling/numbness or weakness.     Relieving factors: Taking a nap.  Aggravating factors: Lying down, working out, bending over.  Naomy hasn't paid attention if they worsen with sneezing or coughing.  She is usually taking a Tylenol or Tylenol and ibuprofen together, which doesn't help much.  She takes Zofran which helps with her nausea.    No FH of  migraines.     Naomy drinks a cup of coffee a day.  She sleeps 7-8 hrs per night.  She is taking Trazodone.  She switched from IUD to OCP in December. Has been on OCP in high school.   She had bacterial meningitis at age 6 months.   She did not get Covid.     Past Medical History: History of bacterial meningitis at age 6-month, anxiety, acne.    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 She is , no children.     Family History: No family history of migraine.      Current Outpatient Medications   Medication Sig Dispense Refill     clindamycin (CLINDAMAX) 1 % external gel Apply topically daily       norethindrone-ethinyl estradiol (MICROGESTIN 1.5/30) 1.5-30 MG-MCG tablet Take 1 tablet by mouth daily 112 tablet 4     propranolol (INDERAL) 10 MG tablet Take 1 tablet (10 mg) by mouth as needed (anxiety) 30 tablet 11     sertraline (ZOLOFT) 100 MG tablet Take 1 tablet (100 mg) by mouth daily 90 tablet 3     spironolactone (ALDACTONE) 100 MG tablet Take 1 tablet (100 mg) by mouth daily 90 tablet 3     traZODone (DESYREL) 50 MG tablet Take 0.5 tablets (25 mg) by mouth At Bedtime 45 tablet 3     tretinoin (RETIN-A) 0.05 % external cream Apply once daily to acne lesions before bedtime or in the evening. 45 g 6     Exam:    Wt Readings from Last 5 Encounters:   12/07/21 61 kg (134 lb 8 oz)   07/02/21 58.6 kg (129 lb 4 oz)     Alert, awake, NAD, no aphasia or dysarthria, EOMI, face is symmetric, moves upper extremities equally.      Assessment/Plan:     Likely atypical migraine headaches: Naomy denies an aura.  Her headaches are usually one-sided associated with nausea, photo/phonosensitivity.  They improve with rest and worsen with activity.  Her headaches are likely atypical migraine headaches, however since there are some red flags for increased intracranial pressure, will do a brain MRI wo and w contrast to rule out intracranial lesions.  If that was  normal, will consider migraine prophylactic and abortive medications.     - Brain MRI wo and w contrast  - Follow up phone call after MRI        As described above, I met with the patient for 16 minutes and during this time counseling was greater than 50% of the visit time. I spent an additional 20 min on chart review and documentation. This note was created in part by the use of Dragon voice recognition system. Inadvertent grammatical errors and typographical errors may still exist.  Sadaf Ruiz MD

## 2022-04-29 NOTE — NURSING NOTE
I called patient and attempted the rooming process. Pt did not answer, I left a message for patient to call back.  If they do not I will attempt to call back in 10-15 min.    Maureen Milian LPN

## 2022-05-24 ENCOUNTER — DOCUMENTATION ONLY (OUTPATIENT)
Dept: OPTOMETRY | Facility: CLINIC | Age: 27
End: 2022-05-24
Payer: COMMERCIAL

## 2022-05-24 NOTE — PROGRESS NOTES
Ref [id number]    Ordered lenses:    right eye: Custom Stable Elite, bc 8.23, power -2.75, diam 15.8, +5/-2, 2 BD prism, Opt Extra clear  left eye; Custom Stable Elite, bc 8.23 power -3.75, diam 15.8, +5/-2, 2 BI prism Opt Extra blue      Per lab they can go up to 4D prism

## 2022-06-08 ENCOUNTER — OFFICE VISIT (OUTPATIENT)
Dept: OPTOMETRY | Facility: CLINIC | Age: 27
End: 2022-06-08
Payer: COMMERCIAL

## 2022-06-08 DIAGNOSIS — H50.21 HYPERTROPIA OF RIGHT EYE: Primary | ICD-10-CM

## 2022-06-08 DIAGNOSIS — H52.13 MYOPIA OF BOTH EYES: ICD-10-CM

## 2022-06-08 ASSESSMENT — REFRACTION_CURRENTRX
OD_AXIS: 080
OD_SPHERE: -2.75
OS_DIAMETER: 14.8
OS_BRAND: CUSTOM STABLE ELITE
OD_BASECURVE: 8.6
OS_BRAND: SPECIALEYES
OD_BASECURVE: 8.23
OD_CYLINDER: -0.25
OS_ADDL_SPECS: OPT EXTRA BLUE
OS_BASECURVE: 8.23
OD_DIAMETER: 15.8
OS_SPHERE: -3.25
OD_BRAND: CUSTOM STABLE ELITE
OS_BASECURVE: 8.7
OD_DIAMETER: 14.8
OS_SPHERE: -3.75
OD_ADDL_SPECS: OPT EXTRA CLEAR
OS_DIAMETER: 15.8
OD_BRAND: SPECIALEYES
OD_SPHERE: -1.75

## 2022-06-09 ASSESSMENT — EXTERNAL EXAM - LEFT EYE: OS_EXAM: NORMAL

## 2022-06-09 ASSESSMENT — VISUAL ACUITY
CORRECTION_TYPE: GLASSES
OD_CC: 20/20
OS_CC: 20/20
METHOD: SNELLEN - LINEAR

## 2022-06-09 ASSESSMENT — SLIT LAMP EXAM - LIDS
COMMENTS: NORMAL
COMMENTS: NORMAL

## 2022-06-09 ASSESSMENT — EXTERNAL EXAM - RIGHT EYE: OD_EXAM: NORMAL

## 2022-06-09 NOTE — PROGRESS NOTES
Rx recheck only. Scleral lens trial. Will trial in real life and f/u via [employer]    Contact Lens Billing  V-Code:  - GP scleral  Final Contact Lens Rx       Brand Base Curve Diameter Sphere Lens Addl. Specs    Right Custom Stable Elite 8.23 15.8 -2.75 +5/-2, 2 BD prism Opt Extra clear    Left Custom Stable Elite 8.23 15.8 -3.75 +5/-2, 2 BI prism Opt Extra blue         # of units: 2  Price per Unit: $215    These are for cosmetic contact lenses.    Encounter Diagnoses   Name Primary?     Hypertropia of right eye Yes     Myopia of both eyes

## 2022-06-17 ENCOUNTER — ANCILLARY PROCEDURE (OUTPATIENT)
Dept: MRI IMAGING | Facility: CLINIC | Age: 27
End: 2022-06-17
Attending: PSYCHIATRY & NEUROLOGY
Payer: COMMERCIAL

## 2022-06-17 DIAGNOSIS — R51.9 HEADACHE DISORDER: ICD-10-CM

## 2022-06-17 PROCEDURE — A9585 GADOBUTROL INJECTION: HCPCS | Performed by: RADIOLOGY

## 2022-06-17 PROCEDURE — 70553 MRI BRAIN STEM W/O & W/DYE: CPT | Performed by: RADIOLOGY

## 2022-06-17 RX ORDER — GADOBUTROL 604.72 MG/ML
7.5 INJECTION INTRAVENOUS ONCE
Status: COMPLETED | OUTPATIENT
Start: 2022-06-17 | End: 2022-06-17

## 2022-06-17 RX ADMIN — GADOBUTROL 6 ML: 604.72 INJECTION INTRAVENOUS at 17:38

## 2022-07-05 ENCOUNTER — MYC MEDICAL ADVICE (OUTPATIENT)
Dept: FAMILY MEDICINE | Facility: CLINIC | Age: 27
End: 2022-07-05

## 2022-07-05 DIAGNOSIS — F41.9 ANXIETY: ICD-10-CM

## 2022-07-06 RX ORDER — SERTRALINE HYDROCHLORIDE 100 MG/1
100 TABLET, FILM COATED ORAL DAILY
Qty: 30 TABLET | Refills: 0 | Status: SHIPPED | OUTPATIENT
Start: 2022-07-06 | End: 2022-07-22

## 2022-07-06 RX ORDER — TRAZODONE HYDROCHLORIDE 50 MG/1
25 TABLET, FILM COATED ORAL AT BEDTIME
Qty: 15 TABLET | Refills: 0 | Status: SHIPPED | OUTPATIENT
Start: 2022-07-06 | End: 2022-07-22

## 2022-07-06 NOTE — TELEPHONE ENCOUNTER
Medication requested: sertraline (ZOLOFT) 100 MG tablet  Last office visit: 7/2/21  [facility] appointments: 7/22/22  Medication last refilled: 7/2/21; 90 + 3 refills  Last qualifying labs:   PHQ-9 / LARA-7 Scores 8/2015 to present 12/7/2021   LARA-7 Score     LARA-7 Score DocFlow 0   PHQ-9 Score DocFlow      Medication requested: traZODone (DESYREL) 50 MG tablet  Last office visit: 7/2/21  [facility] appointments: 7/22/22  Medication last refilled: 7/2/21; 45 + 3 refills  Last qualifying labs: See above    **Sending 30 day supply of medications to cover pt until she is seen on 7/22/22 for physical**    Yves GARG, RN  07/06/22 10:12 AM

## 2022-07-21 NOTE — PROGRESS NOTES
"   SUBJECTIVE:   CC: Naomy Mark is an 27 year old woman who presents for preventive health visit.     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\" and nonspecific scattered foci of susceptibility in white matter  - diagnosis is not definitively migraines    Today's PHQ-2 Score:   PHQ-2 ( 1999 [employer]) 7/22/2022   Q1: Little interest or pleasure in doing things 0   Q2: Feeling down, depressed or hopeless 0   PHQ-2 Score 0   PHQ-2 Total Score (12-17 Years)- Positive if 3 or more points; Administer PHQ-A if positive -   Q1: Little interest or " pleasure in doing things Not at all   Q2: Feeling down, depressed or hopeless Not at all   PHQ-2 Score 0       Abuse: Current or Past (Physical, Sexual or Emotional) - No  Do you feel safe in your environment? Yes    Have you ever done Advance Care Planning? (For example, a Health Directive, POLST, or a discussion with a medical provider or your loved ones about your wishes): No, advance care planning information given to patient to review.  Patient plans to discuss their wishes with loved ones or provider.      Social History     Tobacco Use     Smoking status: Never Smoker     Smokeless tobacco: Never Used   Substance Use Topics     Alcohol use: Yes     Comment: 2-3 drinks per week       Alcohol Use 7/22/2022   Prescreen: >3 drinks/day or >7 drinks/week? No     Reviewed orders with patient.  Reviewed health maintenance and updated orders accordingly - Yes      Breast Cancer Screening:  Any new diagnosis of family breast, ovarian, or bowel cancer? No    FHS-7: No flowsheet data found.    Pertinent mammograms are reviewed under the imaging tab.    History of abnormal Pap smear: NO - age 21-29 PAP every 3 years recommended  PAP / HPV 6/14/2019   PAP (Historical) NIL     Reviewed and updated as needed this visit by clinical staff   Tobacco  Allergies  Meds   Med Hx  Surg Hx  Fam Hx  Soc Hx          Reviewed and updated as needed this visit by Provider   Tobacco   Meds   Med Hx  Surg Hx  Fam Hx  Soc Hx           Review of Systems   Constitutional: Negative for chills and fever.   HENT: Negative for congestion, ear pain, hearing loss and sore throat.    Eyes: Negative for pain and visual disturbance.   Respiratory: Negative for cough and shortness of breath.    Cardiovascular: Negative for chest pain, palpitations and peripheral edema.   Gastrointestinal: Negative for abdominal pain, constipation, diarrhea, heartburn, hematochezia and nausea.   Breasts:  Negative for tenderness, breast mass and discharge.  "  Genitourinary: Negative for dysuria, frequency, genital sores, hematuria, pelvic pain, urgency, vaginal bleeding and vaginal discharge.   Musculoskeletal: Negative for arthralgias, joint swelling and myalgias.   Skin: Negative for rash.   Neurological: Negative for dizziness, weakness, headaches and paresthesias.   Psychiatric/Behavioral: Negative for mood changes. The patient is not nervous/anxious.       OBJECTIVE:   BP 99/65 (BP Location: Left arm, Patient Position: Sitting, Cuff Size: Adult Regular)   Pulse 63   Temp 97.5  F (36.4  C) (Skin)   Resp 12   Ht 1.742 m (5' 8.58\")   Wt 59.9 kg (132 lb)   LMP 07/01/2022 (Exact Date)   SpO2 96%   BMI 19.73 kg/m    Physical Exam  GENERAL: healthy, alert and no distress  EYES: Eyes grossly normal to inspection, PERRL and conjunctivae and sclerae normal  HENT: ear canals and TM's normal, nose and mouth without ulcers or lesions  NECK: no adenopathy, no asymmetry, masses, or scars and thyroid normal to palpation  RESP: lungs clear to auscultation - no rales, rhonchi or wheezes  BREAST: normal without masses, tenderness or nipple discharge and no palpable axillary masses or adenopathy  CV: regular rate and rhythm, normal S1 S2, no S3 or S4, no murmur, click or rub, no peripheral edema and peripheral pulses strong  ABDOMEN: soft, nontender, no hepatosplenomegaly, no masses and bowel sounds normal  MS: no gross musculoskeletal defects noted, no edema  SKIN: no suspicious lesions or rashes  NEURO: Normal strength and tone, mentation intact and speech normal  PSYCH: mentation appears normal, affect normal/bright    Diagnostic Test Results:  Labs reviewed in Epic    ASSESSMENT/PLAN:       ICD-10-CM    1. Well woman exam  Z01.419    2. Anxiety  F41.9 sertraline (ZOLOFT) 50 MG tablet     sertraline (ZOLOFT) 25 MG tablet     traZODone (DESYREL) 50 MG tablet   3. Acne vulgaris  L70.0 spironolactone (ALDACTONE) 100 MG tablet     tretinoin (RETIN-A) 0.05 % external cream     " "clindamycin (CLINDAMAX) 1 % external gel     Given that anxiety has been so well controlled for so long now, we are going to slowly try to wean down or even off of sertraline. Will decrease dose by 25mg a day every month, starting with decreasing to 75mg daily. [facility]     COUNSELING:  Reviewed preventive health counseling, as reflected in patient instructions       Regular exercise       Consider Hep C screening for all patients one time for ages 18-79 years       Advance Care Planning    Estimated body mass index is 19.73 kg/m  as calculated from the following:    Height as of this encounter: 1.742 m (5' 8.58\").    Weight as of this encounter: 59.9 kg (132 lb).      She reports that she has never smoked. She has never used smokeless tobacco.      Mann Chau MD  [facility]  "

## 2022-07-22 ENCOUNTER — OFFICE VISIT (OUTPATIENT)
Dept: FAMILY MEDICINE | Facility: CLINIC | Age: 27
End: 2022-07-22
Payer: COMMERCIAL

## 2022-07-22 ENCOUNTER — OFFICE VISIT (OUTPATIENT)
Dept: OBGYN | Facility: CLINIC | Age: 27
End: 2022-07-22
Attending: ADVANCED PRACTICE MIDWIFE
Payer: COMMERCIAL

## 2022-07-22 VITALS
SYSTOLIC BLOOD PRESSURE: 100 MMHG | HEIGHT: 69 IN | DIASTOLIC BLOOD PRESSURE: 58 MMHG | WEIGHT: 132.7 LBS | BODY MASS INDEX: 19.65 KG/M2 | HEART RATE: 64 BPM

## 2022-07-22 VITALS
HEIGHT: 69 IN | SYSTOLIC BLOOD PRESSURE: 99 MMHG | RESPIRATION RATE: 12 BRPM | BODY MASS INDEX: 19.55 KG/M2 | TEMPERATURE: 97.5 F | WEIGHT: 132 LBS | OXYGEN SATURATION: 96 % | DIASTOLIC BLOOD PRESSURE: 65 MMHG | HEART RATE: 63 BPM

## 2022-07-22 DIAGNOSIS — Z12.4 SCREENING FOR MALIGNANT NEOPLASM OF CERVIX: ICD-10-CM

## 2022-07-22 DIAGNOSIS — F41.9 ANXIETY: ICD-10-CM

## 2022-07-22 DIAGNOSIS — Z30.41 ENCOUNTER FOR SURVEILLANCE OF CONTRACEPTIVE PILLS: ICD-10-CM

## 2022-07-22 DIAGNOSIS — Z01.419 ENCOUNTER FOR ANNUAL ROUTINE GYNECOLOGICAL EXAMINATION: Primary | ICD-10-CM

## 2022-07-22 DIAGNOSIS — Z01.419 WELL WOMAN EXAM: Primary | ICD-10-CM

## 2022-07-22 DIAGNOSIS — L70.0 ACNE VULGARIS: ICD-10-CM

## 2022-07-22 PROCEDURE — 99395 PREV VISIT EST AGE 18-39: CPT | Performed by: ADVANCED PRACTICE MIDWIFE

## 2022-07-22 PROCEDURE — G0145 SCR C/V CYTO,THINLAYER,RESCR: HCPCS | Performed by: ADVANCED PRACTICE MIDWIFE

## 2022-07-22 PROCEDURE — G0463 HOSPITAL OUTPT CLINIC VISIT: HCPCS

## 2022-07-22 RX ORDER — NORETHINDRONE ACETATE AND ETHINYL ESTRADIOL .03; 1.5 MG/1; MG/1
1 TABLET ORAL DAILY
Qty: 112 TABLET | Refills: 3 | Status: SHIPPED | OUTPATIENT
Start: 2022-07-22 | End: 2022-07-22

## 2022-07-22 RX ORDER — SPIRONOLACTONE 100 MG/1
100 TABLET, FILM COATED ORAL DAILY
Qty: 90 TABLET | Refills: 3 | Status: SHIPPED | OUTPATIENT
Start: 2022-07-22 | End: 2023-07-28

## 2022-07-22 RX ORDER — TRETINOIN 0.5 MG/G
CREAM TOPICAL
Qty: 45 G | Refills: 6 | Status: SHIPPED | OUTPATIENT
Start: 2022-07-22 | End: 2023-12-09

## 2022-07-22 RX ORDER — SERTRALINE HYDROCHLORIDE 25 MG/1
25 TABLET, FILM COATED ORAL DAILY
Qty: 90 TABLET | Refills: 0 | Status: SHIPPED | OUTPATIENT
Start: 2022-07-22 | End: 2022-10-31

## 2022-07-22 RX ORDER — TRAZODONE HYDROCHLORIDE 50 MG/1
25 TABLET, FILM COATED ORAL AT BEDTIME
Qty: 45 TABLET | Refills: 3 | Status: SHIPPED | OUTPATIENT
Start: 2022-07-22 | End: 2023-07-27

## 2022-07-22 RX ORDER — CLINDAMYCIN PHOSPHATE 10 MG/G
GEL TOPICAL DAILY
Qty: 30 G | Refills: 11 | Status: SHIPPED | OUTPATIENT
Start: 2022-07-22 | End: 2023-07-28

## 2022-07-22 ASSESSMENT — ENCOUNTER SYMPTOMS
ARTHRALGIAS: 0
CHILLS: 0
FREQUENCY: 0
NERVOUS/ANXIOUS: 0
PALPITATIONS: 0
HEMATOCHEZIA: 0
HEARTBURN: 0
CONSTIPATION: 0
HEMATURIA: 0
MYALGIAS: 0
PARESTHESIAS: 0
SORE THROAT: 0
DIZZINESS: 0
JOINT SWELLING: 0
WEAKNESS: 0
EYE PAIN: 0
BREAST MASS: 0
SHORTNESS OF BREATH: 0
NAUSEA: 0
ABDOMINAL PAIN: 0
DIARRHEA: 0
HEADACHES: 0
DYSURIA: 0
FEVER: 0
COUGH: 0

## 2022-07-22 ASSESSMENT — ANXIETY QUESTIONNAIRES
2. NOT BEING ABLE TO STOP OR CONTROL WORRYING: NOT AT ALL
6. BECOMING EASILY ANNOYED OR IRRITABLE: NOT AT ALL
GAD7 TOTAL SCORE: 0
GAD7 TOTAL SCORE: 0
5. BEING SO RESTLESS THAT IT IS HARD TO SIT STILL: NOT AT ALL
3. WORRYING TOO MUCH ABOUT DIFFERENT THINGS: NOT AT ALL
7. FEELING AFRAID AS IF SOMETHING AWFUL MIGHT HAPPEN: NOT AT ALL
1. FEELING NERVOUS, ANXIOUS, OR ON EDGE: NOT AT ALL

## 2022-07-22 ASSESSMENT — PATIENT HEALTH QUESTIONNAIRE - PHQ9
SUM OF ALL RESPONSES TO PHQ QUESTIONS 1-9: 0
5. POOR APPETITE OR OVEREATING: NOT AT ALL

## 2022-07-22 NOTE — PROGRESS NOTES
Progress Note    SUBJECTIVE:  Naomy Mark is an 27 year old, , who requests an Annual Preventive Exam. Patient is doing well and has no acute complaints. No breast concerns, no abnormal vaginal bleeding, no abnormal discharge, no concerns for STDs, no dyspareunia. Patient had an IUD prior but was switched to OCP late last year to help with hormonal acne. Patient states her acne has improved on the OCPs and she has no complaints.    Concerns today include:   -Pap  -OCP refill    Menstrual History:  Menstrual History 2022   LAST MENSTRUAL PERIOD 2022       Last    Lab Results   Component Value Date    PAP NIL 2019     History of abnormal Pap smear: NO - age 21-29 PAP every 3 years recommended        Mammogram current: not applicable  Last Mammogram:   No results found.     Last Colonoscopy: NA        HISTORY:  clindamycin (CLINDAMAX) 1 % external gel, Apply topically daily  norethindrone-ethinyl estradiol (MICROGESTIN 1.5/30) 1.5-30 MG-MCG tablet, Take 1 tablet by mouth daily  propranolol (INDERAL) 10 MG tablet, Take 1 tablet (10 mg) by mouth as needed (anxiety)  sertraline (ZOLOFT) 100 MG tablet, Take 1 tablet (100 mg) by mouth daily  spironolactone (ALDACTONE) 100 MG tablet, Take 1 tablet (100 mg) by mouth daily  traZODone (DESYREL) 50 MG tablet, Take 0.5 tablets (25 mg) by mouth At Bedtime  tretinoin (RETIN-A) 0.05 % external cream, Apply once daily to acne lesions before bedtime or in the evening.    No current facility-administered medications on file prior to visit.    No Known Allergies  Immunization History   Administered Date(s) Administered     COVID-19,PF,Pfizer (12+ Yrs) 2020, 01/15/2021, 2021     DTAP (<7y) 2015     FLU 6-35 months 2020     Flu, Unspecified 10/16/2015, 10/12/2016, 10/02/2019     HPV Quadrivalent 2007, 10/17/2007, 2008     Hep B, Peds or Adolescent 1994, 1994, 10/14/1994     HepA-Adult 2010, 2011      "Influenza Vaccine IM > 6 months Valent IIV4 (Alfuria,Fluzone) 2020     MMR 10/14/1994, 1998     Meningococcal (Menactra ) 2007     Varicella 2004, 2006       OB History    Para Term  AB Living   0 0 0 0 0 0   SAB IAB Ectopic Multiple Live Births   0 0 0 0 0     Past Medical History:   Diagnosis Date     Acne      Anxiety      History of heavy periods      History reviewed. No pertinent surgical history.  Family History   Problem Relation Age of Onset     Retinal detachment Mother      No Known Problems Father      No Known Problems Sister      No Known Problems Brother      Cerebrovascular Disease Maternal Grandmother 70     Lymphoma Maternal Grandfather         NHL     No Known Problems Paternal Grandmother      ALS Paternal Grandfather 65     Diabetes No family hx of      Heart Disease No family hx of      Breast Cancer No family hx of      Colon Cancer No family hx of      Ovarian Cancer No family hx of      Glaucoma No family hx of      Macular Degeneration No family hx of      Social History     Socioeconomic History     Marital status:      Spouse name: None     Number of children: None     Years of education: None     Highest education level: None   Tobacco Use     Smoking status: Never Smoker     Smokeless tobacco: Never Used   Substance and Sexual Activity     Alcohol use: Yes     Drug use: Never     Sexual activity: Yes     Partners: Male     Birth control/protection: I.U.D.     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 Body mass index is 19.6 kg/m .  General - " pleasant female in no acute distress.  Skin - no suspicious lesions or rashes   Neck - supple without lymphadenopathy.  Lungs - clear to auscultation bilaterally.  Heart - regular rate and rhythm without murmur.  Abdomen - soft, nontender, nondistended, no masses or organomegaly noted.  Musculoskeletal - no gross deformities.    Breast Exam:  Deferred    Pelvic Exam:  EG/BUS: Normal genital architecture without lesions, erythema or abnormal secretions. Normal genital architecture without lesions, erythema or abnormal secretions Bartholin's, Urethra, [place]'s normal   Urethral meatus: normal   Urethra: no masses, tenderness, or scarring   Bladder: no masses or tenderness   Vagina: moist, pink, rugae with creamy, white and odorless  secretions  Cervix: Normal, nabothian cyst present at 1 o clock position, erythematous area around os suggestive of ectropion      ASSESSMENT:    Patient is a 28 yo G0 who presents fore her annual exam. Patient is doing well on OCP, no acute complaints.    PLAN:     -Pap done today  -Refills for OCP given      Return to clinic in one year.  Follow-up as needed.    I, Joycelyn Louis MD, am serving as a scribe; to document services personally performed by  Cris Camacho CNM  based on data collection and the provider's statements to me.     Joycelyn Louis MD  Ob/Gyn Resident, PGY-1  July 22, 2022, 9:28 AM    I agree with the PFSH and ROS as completed by Joycelyn Louis MD except for changes made by me. The remainder of the encounter was performed by me and scribed by Joycelyn Louis MD. The scribed note accurately reflects my personal services and decisions made by me.   MARGARITO Gutiérrez CNM

## 2022-07-22 NOTE — LETTER
2022       RE: Naomy Mark  [address]  [address]  [place] [address]     Dear Colleague,    Thank you for referring your patient, Naomy Mark, to the [facility] [place] at [facility]. Please see a copy of my visit note below.      Progress Note    SUBJECTIVE:  Naomy Mark is an 27 year old, , who requests an Annual Preventive Exam. Patient is doing well and has no acute complaints. No breast concerns, no abnormal vaginal bleeding, no abnormal discharge, no concerns for STDs, no dyspareunia. Patient had an IUD prior but was switched to OCP late last year to help with hormonal acne. Patient states her acne has improved on the OCPs and she has no complaints.    Concerns today include:   -Pap  -OCP refill    Menstrual History:  Menstrual History 2022   LAST MENSTRUAL PERIOD 2022       Last    Lab Results   Component Value Date    PAP NIL 2019     History of abnormal Pap smear: NO - age 21-29 PAP every 3 years recommended        Mammogram current: not applicable  Last Mammogram:   No results found.     Last Colonoscopy: NA        HISTORY:  clindamycin (CLINDAMAX) 1 % external gel, Apply topically daily  norethindrone-ethinyl estradiol (MICROGESTIN 1.5/30) 1.5-30 MG-MCG tablet, Take 1 tablet by mouth daily  propranolol (INDERAL) 10 MG tablet, Take 1 tablet (10 mg) by mouth as needed (anxiety)  sertraline (ZOLOFT) 100 MG tablet, Take 1 tablet (100 mg) by mouth daily  spironolactone (ALDACTONE) 100 MG tablet, Take 1 tablet (100 mg) by mouth daily  traZODone (DESYREL) 50 MG tablet, Take 0.5 tablets (25 mg) by mouth At Bedtime  tretinoin (RETIN-A) 0.05 % external cream, Apply once daily to acne lesions before bedtime or in the evening.    No current facility-administered medications on file prior to visit.    No Known Allergies  Immunization History   Administered Date(s) Administered      COVID-19,PF,Pfizer (12+ Yrs) 2020, 01/15/2021, 2021     DTAP (<7y) 2015     FLU 6-35 months 2020     Flu, Unspecified 10/16/2015, 10/12/2016, 10/02/2019     HPV Quadrivalent 2007, 10/17/2007, 2008     Hep B, Peds or Adolescent 1994, 1994, 10/14/1994     HepA-Adult 2010, 2011     Influenza Vaccine IM > 6 months Valent IIV4 (Alfuria,Fluzone) 2020     MMR 10/14/1994, 1998     Meningococcal (Menactra ) 2007     Varicella 2004, 2006       OB History    Para Term  AB Living   0 0 0 0 0 0   SAB IAB Ectopic Multiple Live Births   0 0 0 0 0     Past Medical History:   Diagnosis Date     Acne      Anxiety      History of heavy periods      History reviewed. No pertinent surgical history.  Family History   Problem Relation Age of Onset     Retinal detachment Mother      No Known Problems Father      No Known Problems Sister      No Known Problems Brother      Cerebrovascular Disease Maternal Grandmother 70     Lymphoma Maternal Grandfather         NHL     No Known Problems Paternal Grandmother      ALS Paternal Grandfather 65     Diabetes No family hx of      Heart Disease No family hx of      Breast Cancer No family hx of      Colon Cancer No family hx of      Ovarian Cancer No family hx of      Glaucoma No family hx of      Macular Degeneration No family hx of      Social History     Socioeconomic History     Marital status:      Spouse name: None     Number of children: None     Years of education: None     Highest education level: None   Tobacco Use     Smoking status: Never Smoker     Smokeless tobacco: Never Used   Substance and Sexual Activity     Alcohol use: Yes     Drug use: Never     Sexual activity: Yes     Partners: Male     Birth control/protection: I.U.D.     Comment: sexually exclusive with    Social History Narrative    Lives with  who is ENT resident    PGY3 dermatology at [employer] as of  "July 2021       ROS  Unremarkable  PHQ-9 SCORE 7/2/2021   PHQ-9 Total Score 0     LARA-7 SCORE 7/2/2021 12/7/2021   Total Score 0 0         EXAM:  Blood pressure 100/58, pulse 64, height 1.753 m (5' 9\"), weight 60.2 kg (132 lb 11.2 oz), last menstrual period 07/01/2022, not currently breastfeeding. Body mass index is 19.6 kg/m .  General - pleasant female in no acute distress.  Skin - no suspicious lesions or rashes   Neck - supple without lymphadenopathy.  Lungs - clear to auscultation bilaterally.  Heart - regular rate and rhythm without murmur.  Abdomen - soft, nontender, nondistended, no masses or organomegaly noted.  Musculoskeletal - no gross deformities.    Breast Exam:  Deferred    Pelvic Exam:  EG/BUS: Normal genital architecture without lesions, erythema or abnormal secretions. Normal genital architecture without lesions, erythema or abnormal secretions Bartholin's, Urethra, [place]'s normal   Urethral meatus: normal   Urethra: no masses, tenderness, or scarring   Bladder: no masses or tenderness   Vagina: moist, pink, rugae with creamy, white and odorless  secretions  Cervix: Normal, nabothian cyst present at 1 o clock position, erythematous area around os suggestive of ectropion      ASSESSMENT:    Patient is a 28 yo G0 who presents fore her annual exam. Patient is doing well on OCP, no acute complaints.    PLAN:     -Pap done today  -Refills for OCP given      Return to clinic in one year.  Follow-up as needed.    I, Joycelyn Louis MD, am serving as a scribe; to document services personally performed by  Cris Camacho CNM  based on data collection and the provider's statements to me.     Joycelyn Louis MD  Ob/Gyn Resident, PGY-1  July 22, 2022, 9:28 AM    I agree with the PFSH and ROS as completed by Joycelyn Louis MD except for changes made by me. The remainder of the encounter was performed by me and scribed by Joycelyn Louis MD. The scribed note accurately reflects my personal services and decisions " made by me.   MARGARITO GutiérrezM

## 2022-07-22 NOTE — NURSING NOTE
"27 year old  Chief Complaint   Patient presents with     Physical       Blood pressure 99/65, pulse 63, temperature 97.5  F (36.4  C), temperature source Skin, resp. rate 12, height 1.742 m (5' 8.58\"), weight 59.9 kg (132 lb), last menstrual period 07/01/2022, SpO2 96 %, not currently breastfeeding. Body mass index is 19.73 kg/m .  Patient Active Problem List   Diagnosis     Anxiety     Hypertropia of right eye     Myopia of both eyes with astigmatism     Acne vulgaris       Wt Readings from Last 2 Encounters:   07/22/22 59.9 kg (132 lb)   07/22/22 60.2 kg (132 lb 11.2 oz)     BP Readings from Last 3 Encounters:   07/22/22 99/65   07/22/22 100/58   12/07/21 109/72         Current Outpatient Medications   Medication     clindamycin (CLINDAMAX) 1 % external gel     norethindrone-ethinyl estradiol (MICROGESTIN 1.5/30) 1.5-30 MG-MCG tablet     propranolol (INDERAL) 10 MG tablet     sertraline (ZOLOFT) 100 MG tablet     spironolactone (ALDACTONE) 100 MG tablet     traZODone (DESYREL) 50 MG tablet     tretinoin (RETIN-A) 0.05 % external cream     norethindrone-ethinyl estradiol (MICROGESTIN 1.5/30) 1.5-30 MG-MCG tablet     No current facility-administered medications for this visit.       Social History     Tobacco Use     Smoking status: Never Smoker     Smokeless tobacco: Never Used   Vaping Use     Vaping Use: Never used   Substance Use Topics     Alcohol use: Yes     Drug use: Never       Health Maintenance Due   Topic Date Due     HEPATITIS C SCREENING  Never done       Lab Results   Component Value Date    PAP NIL 06/14/2019 July 22, 2022 4:00 PM    "

## 2022-07-27 LAB
BKR LAB AP GYN ADEQUACY: NORMAL
BKR LAB AP GYN INTERPRETATION: NORMAL
BKR LAB AP HPV REFLEX: NORMAL
BKR LAB AP LMP: NORMAL
BKR LAB AP PREVIOUS ABNORMAL: NORMAL
PATH REPORT.COMMENTS IMP SPEC: NORMAL
PATH REPORT.COMMENTS IMP SPEC: NORMAL
PATH REPORT.RELEVANT HX SPEC: NORMAL

## 2022-08-02 ASSESSMENT — REFRACTION_FINALRX
OS_HPRISM: 1 BU
OD_HPRISM: 1 BD

## 2022-08-02 ASSESSMENT — REFRACTION_WEARINGRX
OS_CYLINDER: SPHERE
OD_SPHERE: -2.25
OS_VBASE: UP
OD_VPRISM: 1 DOWN
OD_VBASE: DOWN
OD_CYLINDER: +0.50
OS_SPHERE: -3.25
OD_AXIS: 175
OS_VPRISM: 1 UP

## 2022-09-25 ENCOUNTER — HEALTH MAINTENANCE LETTER (OUTPATIENT)
Age: 27
End: 2022-09-25

## 2022-09-30 ENCOUNTER — VIRTUAL VISIT (OUTPATIENT)
Dept: NEUROLOGY | Facility: CLINIC | Age: 27
End: 2022-09-30

## 2022-09-30 DIAGNOSIS — G43.009 MIGRAINE WITHOUT AURA AND WITHOUT STATUS MIGRAINOSUS, NOT INTRACTABLE: Primary | ICD-10-CM

## 2022-09-30 PROCEDURE — 99213 OFFICE O/P EST LOW 20 MIN: CPT | Mod: 95 | Performed by: PSYCHIATRY & NEUROLOGY

## 2022-09-30 RX ORDER — SUMATRIPTAN 50 MG/1
50 TABLET, FILM COATED ORAL
Qty: 10 TABLET | Refills: 5 | Status: SHIPPED | OUTPATIENT
Start: 2022-09-30 | End: 2023-06-02

## 2022-09-30 RX ORDER — ONDANSETRON 4 MG/1
4 TABLET, ORALLY DISINTEGRATING ORAL EVERY 8 HOURS PRN
Qty: 10 TABLET | Refills: 5 | Status: SHIPPED | OUTPATIENT
Start: 2022-09-30 | End: 2023-12-09

## 2022-09-30 NOTE — PROGRESS NOTES
Naomy is a 27 year old who is being evaluated via a billable video visit.      How would you like to obtain your AVS? MyChart  If the video visit is dropped, the invitation should be resent by: Text to cell phone: [phone]  Will anyone else be joining your video visit? No        Video-Visit Details    Video Start Time: 3:14  Type of service:  Video Visit    Video End Time: 3:23  Originating Location (pt. Location): Home    Distant Location (provider location):  [facility] [place]     Platform used for Video Visit: [facility]      NEUROLOGY PROGRESS NOTE   [facility]    Patient:Naomy Mark  : 1995  Age: 27 year old  Today's virtual visit: 2022    History of present illness:     Naomy is participating in this virtual visit for follow-up of headaches.  She was last seen 22  She believes her headaches have been less frequent.  The quality of the headaches is the same with similar symptoms.  They happen once every 6 weeks. They last all day.     She had brain MRI 2022 which showed nonspecific left frontal white matter changes.  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    Current Outpatient Medications   Medication Sig Dispense Refill     clindamycin (CLINDAMAX) 1 % external gel Apply topically daily 30 g 11     norethindrone-ethinyl estradiol (MICROGESTIN 1.5/30) 1.5-30 MG-MCG tablet Take 1 tablet by mouth daily 112 tablet 4     propranolol (INDERAL) 10 MG tablet Take 1 tablet (10 mg) by mouth as needed (anxiety) 30 tablet 11     sertraline (ZOLOFT) 25 MG tablet Take 1 tablet (25 mg) by mouth daily Take with 50mg tablet for a total of 75mg. 90 tablet 0     sertraline (ZOLOFT) 50 MG tablet Take 1 tablet (50 mg) by mouth daily Take with 25mg tablet for a total of 75mg. 90 tablet 0     spironolactone (ALDACTONE) 100 MG tablet Take 1 tablet (100 mg) by mouth daily 90 tablet 3     traZODone (DESYREL) 50 MG tablet Take 0.5  tablets (25 mg) by mouth At Bedtime 45 tablet 3     tretinoin (RETIN-A) 0.05 % external cream Apply once daily to acne lesions before bedtime or in the evening. 45 g 6     Exam:    There were no vitals taken for this visit.     Wt Readings from Last 5 Encounters:   07/22/22 59.9 kg (132 lb)   07/22/22 60.2 kg (132 lb 11.2 oz)   12/07/21 61 kg (134 lb 8 oz)   07/02/21 58.6 kg (129 lb 4 oz)     Assessment/Plan:     Atypical migraine headaches: we discussed taking a prn medication. Her headaches are not that frequent to start a migraine prophylactic medication. We discussed starting sumatriptan as needed.    - Take sumatriptan 50 mg as needed at onset of headache.     - Take Zofran as needed for nausea.     - follow up in 6 months        As described above, I met with the patient for 8 minutes and during this time counseling was greater than 50% of the visit time.  Sadaf Ruiz MD

## 2022-09-30 NOTE — LETTER
2022         RE: Naomy Mark  [address]  [address]  [place] [address]        Dear Colleague,    Thank you for referring your patient, Naomy Mark, to the [facility] [place]. Please see a copy of my visit note below.       NEUROLOGY PROGRESS NOTE   [facility]    Patient:Naomy Mark  : 1995  Age: 27 year old  Today's virtual visit: 2022    History of present illness:     Naomy is participating in this virtual visit for follow-up of headaches.  She was last seen 22  She believes her headaches have been less frequent.  The quality of the headaches is the same with similar symptoms.  They happen once every 6 weeks. They last all day.     She had brain MRI 2022 which showed nonspecific left frontal white matter changes.  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    Current Outpatient Medications   Medication Sig Dispense Refill     clindamycin (CLINDAMAX) 1 % external gel Apply topically daily 30 g 11     norethindrone-ethinyl estradiol (MICROGESTIN .) 1.5-30 MG-MCG tablet Take 1 tablet by mouth daily 112 tablet 4     propranolol (INDERAL) 10 MG tablet Take 1 tablet (10 mg) by mouth as needed (anxiety) 30 tablet 11     sertraline (ZOLOFT) 25 MG tablet Take 1 tablet (25 mg) by mouth daily Take with 50mg tablet for a total of 75mg. 90 tablet 0     sertraline (ZOLOFT) 50 MG tablet Take 1 tablet (50 mg) by mouth daily Take with 25mg tablet for a total of 75mg. 90 tablet 0     spironolactone (ALDACTONE) 100 MG tablet Take 1 tablet (100 mg) by mouth daily 90 tablet 3     traZODone (DESYREL) 50 MG tablet Take 0.5 tablets (25 mg) by mouth At Bedtime 45 tablet 3     tretinoin (RETIN-A) 0.05 % external cream Apply once daily to acne lesions before bedtime or in the evening. 45 g 6     Exam:    There were no vitals taken for this visit.     Wt Readings from Last 5 Encounters:   22 59.9 kg  (132 lb)   07/22/22 60.2 kg (132 lb 11.2 oz)   12/07/21 61 kg (134 lb 8 oz)   07/02/21 58.6 kg (129 lb 4 oz)     Assessment/Plan:     Atypical migraine headaches: we discussed taking a prn medication. Her headaches are not that frequent to start a migraine prophylactic medication. We discussed starting sumatriptan as needed.    - Take sumatriptan 50 mg as needed at onset of headache.     - Take Zofran as needed for nausea.     - follow up in 6 months        As described above, I met with the patient for 8 minutes and during this time counseling was greater than 50% of the visit time.  Sadaf Ruiz MD

## 2023-01-15 ENCOUNTER — MYC MEDICAL ADVICE (OUTPATIENT)
Dept: OBGYN | Facility: CLINIC | Age: 28
End: 2023-01-15

## 2023-01-15 DIAGNOSIS — Z30.011 ENCOUNTER FOR INITIAL PRESCRIPTION OF CONTRACEPTIVE PILLS: ICD-10-CM

## 2023-01-16 RX ORDER — NORETHINDRONE ACETATE AND ETHINYL ESTRADIOL .03; 1.5 MG/1; MG/1
1 TABLET ORAL DAILY
Qty: 112 TABLET | Refills: 3 | Status: SHIPPED | OUTPATIENT
Start: 2023-01-16 | End: 2023-12-09

## 2023-01-16 NOTE — TELEPHONE ENCOUNTER
Refill request for birth control. Patient was last seen 07/2022. Her pap was negative. Refilled medication per protocol.

## 2023-04-24 DIAGNOSIS — F41.9 ANXIETY: ICD-10-CM

## 2023-05-09 ENCOUNTER — MYC REFILL (OUTPATIENT)
Dept: FAMILY MEDICINE | Facility: CLINIC | Age: 28
End: 2023-05-09

## 2023-05-09 DIAGNOSIS — F41.9 ANXIETY: ICD-10-CM

## 2023-05-10 NOTE — TELEPHONE ENCOUNTER
Sertraline (Zoloft) 50 mg     Last Office Visit: 7/22/22  Future [facility] Appointments: None  Medication last refilled: 4/24/23 #90 with 0 refill(s) (This was sent to her prior pharmacy and I confirmed she never picked it up)    PHQ-9 / LARA-7 Scores 12/7/2021  8:12 AM 7/22/2022  8:20 AM 7/22/2022  9:20 AM   LARA-7 Score DocFlow 0  0 0    PHQ-9 Score DocFlow 0 0 0      Prescription approved per [facility] Refill Protocol.    Deepti Malcolm, SERGEYN, RN, CCM

## 2023-06-02 ENCOUNTER — VIRTUAL VISIT (OUTPATIENT)
Dept: NEUROLOGY | Facility: CLINIC | Age: 28
End: 2023-06-02
Payer: COMMERCIAL

## 2023-06-02 DIAGNOSIS — G43.009 MIGRAINE WITHOUT AURA AND WITHOUT STATUS MIGRAINOSUS, NOT INTRACTABLE: ICD-10-CM

## 2023-06-02 PROCEDURE — 99213 OFFICE O/P EST LOW 20 MIN: CPT | Mod: VID | Performed by: PSYCHIATRY & NEUROLOGY

## 2023-06-02 RX ORDER — PROPRANOLOL HYDROCHLORIDE 20 MG/1
20 TABLET ORAL 2 TIMES DAILY
Qty: 180 TABLET | Refills: 1 | Status: SHIPPED | OUTPATIENT
Start: 2023-06-02 | End: 2024-03-28

## 2023-06-02 RX ORDER — SUMATRIPTAN 50 MG/1
50 TABLET, FILM COATED ORAL
Qty: 30 TABLET | Refills: 1 | Status: SHIPPED | OUTPATIENT
Start: 2023-06-02 | End: 2024-03-28

## 2023-06-02 NOTE — PROGRESS NOTES
Naomy is a 27 year old who is being evaluated via a billable video visit.      How would you like to obtain your AVS? MyChart  If the video visit is dropped, the invitation should be resent by: Send to e-mail at: [email or web address]  Will anyone else be joining your video visit? No      Video-Visit Details    Type of service:  Video Visit     Originating Location (pt. Location): Home  Distant Location (provider location):  Off-site  Platform used for Video Visit: ANDRADE Cole, MOHIT ([facility])

## 2023-06-02 NOTE — PROGRESS NOTES
NEUROLOGY PROGRESS NOTE   [facility]    Patient:Naomy Mark  : 1995  Age: 27 year old  Today's virtual Visit: 2023    History of present illness:     Naomy is participating in this virtual visit for follow-up on her migraine headaches.  Her headache frequency fluctuates beween 2-5 per month, on average once a week.       They're most often one-sided, and occasionally bilateral. Occasionally has nausea.  It is a sharp pain, severity on average is 6 or 7. She denies an aura.  She has photosensitivity and phono sensitivity.  She denies vision changes, such as blurry vision, dizziness, tingling/numbness or weakness.   Sumatriptan helps, usually she takes only 1 tablet and sometimes she needs two. She has altered taste sensation with sumatriptan.      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     Current Outpatient Medications   Medication Sig Dispense Refill     clindamycin (CLINDAMAX) 1 % external gel Apply topically daily 30 g 11     norethindrone-ethinyl estradiol (MICROGESTIN 1.530) 1.5-30 MG-MCG tablet Take 1 tablet by mouth daily 112 tablet 3     ondansetron (ZOFRAN ODT) 4 MG ODT tab Take 1 tablet (4 mg) by mouth every 8 hours as needed for nausea 10 tablet 5     propranolol (INDERAL) 10 MG tablet Take 1 tablet (10 mg) by mouth as needed (anxiety) 30 tablet 11     sertraline (ZOLOFT) 50 MG tablet Take 1 tablet (50 mg) by mouth daily 90 tablet 0     spironolactone (ALDACTONE) 100 MG tablet Take 1 tablet (100 mg) by mouth daily 90 tablet 3     SUMAtriptan (IMITREX) 50 MG tablet Take 1 tablet (50 mg) by mouth at onset of headache for migraine May repeat in 2 hours. Max 4 tablets/24 hours. 10 tablet 5     traZODone (DESYREL) 50 MG tablet Take 0.5 tablets (25 mg) by mouth At Bedtime 45 tablet 3     tretinoin (RETIN-A) 0.05 % external cream Apply once daily to acne lesions before bedtime or in the evening. 45 g 6      Assessment/Plan:     Common migraine headaches: Naomy has unilateral headaches, associated with nausea, photo/phonosensitivity.  On average she has 1 headache per week.  They respond to sumatriptan.  I discussed taking a prophylactic medication to reduce frequency of headaches.  She is taking propranolol as needed for social/situational anxiety.  I suggested to take propranolol for migraine prophylaxis.  I discussed potential side effects including bradycardia and hypotension and decreased exercise tolerability.  She is a runner.  I advised her to monitor for these potential side effects.    -Start taking propranolol 20 mg twice a day.    -Continue taking sumatriptan 50 mg as needed at onset of headache.  May repeat in 2 hours. (Prescription was sent for 3-month supply and 1 refill.)    -Follow-up in 6 months.      As described above, I met with the patient for 7 minutes and during this time counseling was greater than 50% of the visit time.  Sadaf Riuz MD

## 2023-06-02 NOTE — LETTER
2023         RE: Naomy Mark  [address]  [address]  [place] [address]        Dear Colleague,    Thank you for referring your patient, Naomy Mark, to the [facility] [place]. Please see a copy of my visit note below.         NEUROLOGY PROGRESS NOTE   [facility]    Patient:Naomy Mark  : 1995  Age: 27 year old  Today's virtual Visit: 2023    History of present illness:     Naomy is participating in this virtual visit for follow-up on her migraine headaches.  Her headache frequency fluctuates beween 2-5 per month, on average once a week.       They're most often one-sided, and occasionally bilateral. Occasionally has nausea.  It is a sharp pain, severity on average is 6 or 7. She denies an aura.  She has photosensitivity and phono sensitivity.  She denies vision changes, such as blurry vision, dizziness, tingling/numbness or weakness.   Sumatriptan helps, usually she takes only 1 tablet and sometimes she needs two. She has altered taste sensation with sumatriptan.      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     Current Outpatient Medications   Medication Sig Dispense Refill    clindamycin (CLINDAMAX) 1 % external gel Apply topically daily 30 g 11    norethindrone-ethinyl estradiol (MICROGESTIN 1.530) 1.5-30 MG-MCG tablet Take 1 tablet by mouth daily 112 tablet 3    ondansetron (ZOFRAN ODT) 4 MG ODT tab Take 1 tablet (4 mg) by mouth every 8 hours as needed for nausea 10 tablet 5    propranolol (INDERAL) 10 MG tablet Take 1 tablet (10 mg) by mouth as needed (anxiety) 30 tablet 11    sertraline (ZOLOFT) 50 MG tablet Take 1 tablet (50 mg) by mouth daily 90 tablet 0    spironolactone (ALDACTONE) 100 MG tablet Take 1 tablet (100 mg) by mouth daily 90 tablet 3    SUMAtriptan (IMITREX) 50 MG tablet Take 1 tablet (50 mg) by mouth at onset of headache for migraine  May repeat in 2 hours. Max 4 tablets/24 hours. 10 tablet 5    traZODone (DESYREL) 50 MG tablet Take 0.5 tablets (25 mg) by mouth At Bedtime 45 tablet 3    tretinoin (RETIN-A) 0.05 % external cream Apply once daily to acne lesions before bedtime or in the evening. 45 g 6     Assessment/Plan:     Common migraine headaches: Naomy has unilateral headaches, associated with nausea, photo/phonosensitivity.  On average she has 1 headache per week.  They respond to sumatriptan.  I discussed taking a prophylactic medication to reduce frequency of headaches.  She is taking propranolol as needed for social/situational anxiety.  I suggested to take propranolol for migraine prophylaxis.  I discussed potential side effects including bradycardia and hypotension and decreased exercise tolerability.  She is a runner.  I advised her to monitor for these potential side effects.    -Start taking propranolol 20 mg twice a day.    -Continue taking sumatriptan 50 mg as needed at onset of headache.  May repeat in 2 hours. (Prescription was sent for 3-month supply and 1 refill.)    -Follow-up in 6 months.      As described above, I met with the patient for 7 minutes and during this time counseling was greater than 50% of the visit time.  Sadaf Ruiz MD

## 2023-07-20 DIAGNOSIS — F41.9 ANXIETY: ICD-10-CM

## 2023-07-20 DIAGNOSIS — L70.0 ACNE VULGARIS: ICD-10-CM

## 2023-07-20 NOTE — TELEPHONE ENCOUNTER
Trazodone (Desyrel) 50 mg + Spironolactone (Aldactone) 100 mg    Last Office Visit: 7/22/22  Future [facility] Appointments: 7/28/22  Medication last refilled:     7/22/22 #45 with 3 refill(s) - Trazodone   7/22/2290 #with 3 refill(s) - Spironolactone    Vital Signs Systolic Diastolic Pulse   7/22/22 99 65 63   12/7/21 109 72 75     Required labs per protocol:    LAB REF RANGE 9/25/20 10/13/20   Creatinine 0.67-1.17 mg/dL 0.90 0.98   Potassium 3.4-5.3 mmol/L 4.0 3.8   Sodium 137.3-146.3 mmol/L 138 139     Prescriptions both filled last on 4/28/23 for a 3-month supply.  Will hold until his appointment on 7/28/23.    Deepti Malcolm, BSN, RN, CCM

## 2023-07-27 NOTE — PROGRESS NOTES
SUBJECTIVE:   CC: Naomy is an 28 year old who presents for preventive health visit.     Healthy Habits:     Getting at least 3 servings of Calcium per day:  Yes    Bi-annual eye exam:  Yes    Dental care twice a year:  Yes    Sleep apnea or symptoms of sleep apnea:  None    Diet:  Regular (no restrictions)    Frequency of exercise:  4-5 days/week    Duration of exercise:  30-45 minutes    Taking medications regularly:  Yes    Medication side effects:  Not applicable    Additional concerns today:  No    - running - 2-3 days a week, 25 minutes at a time  - weight training - 2 days a week, 25 minutes  - biking - 1 time every 2 weeks for an hour    # Acne  - worse along jaw line  - well controlled currently  - finding 0.05% tretinoin too drying, wants to dial back to 0.025%    Current Regimen  Spironolactone 100mg daily   COCP  Tretinoin 0.05% at night, BP daily    - 10/13/20 Creatinine 0.98, BUN 17, Na 139, K 3.8     # Migraine without aura  - follows with [facility] neurology  - started on propranolol 20mg twice a day for prophylaxis 06/2023  - takes sumatriptan for abortive therapy  - feels like propranolol is already helping  - no dizziness/lightheadedness or fatigue    # LARA  - 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   - decreased to 50mg at last visit 07/2022  - tried going from 50mg to 25mg didn't feel great great on the 25mg daily  - takes propranolol 10mg PRN for presentations        7/2/2021     5:00 PM 12/7/2021     8:12 AM 7/22/2022     9:20 AM   LARA-7 SCORE   Total Score 0 0 0           7/2/2021     5:00 PM 7/22/2022     9:20 AM   PHQ   PHQ-9 Total Score 0 0   Q9: Thoughts of better off dead/self-harm past 2 weeks Not at all Not at all     Social History     Tobacco Use    Smoking status: Never    Smokeless tobacco: Never   Substance Use Topics    Alcohol use: Yes     Comment: 2-3 drinks per week             7/28/2023     3:11 PM   Alcohol Use   Prescreen: >3 drinks/day or >7 drinks/week? No     Reviewed  orders with patient.  Reviewed health maintenance and updated orders accordingly - Yes    Breast Cancer Screening:  Any new diagnosis of family breast, ovarian, or bowel cancer? No    FHS-7:        No data to display              Patient under 40 years of age: Routine Mammogram Screening not recommended.   Pertinent mammograms are reviewed under the imaging tab.    History of abnormal Pap smear: NO - age 30-65 PAP every 5 years with negative HPV co-testing recommended      7/22/2022     8:36 AM 6/14/2019    12:00 AM   PAP / HPV   PAP Negative for Intraepithelial Lesion or Malignancy (NILM)     PAP (Historical)  NIL           This result is from an external source.     Reviewed and updated as needed this visit by clinical staff   Tobacco  Allergies  Meds   Med Hx  Surg Hx  Fam Hx  Soc Hx        Reviewed and updated as needed this visit by Provider   Tobacco  Allergies  Meds   Med Hx  Surg Hx  Fam Hx  Soc Hx         Review of Systems   Constitutional:  Negative for chills and fever.   HENT:  Negative for congestion, ear pain, hearing loss and sore throat.    Eyes:  Negative for pain and visual disturbance.   Respiratory:  Negative for cough and shortness of breath.    Cardiovascular:  Negative for chest pain, palpitations and peripheral edema.   Gastrointestinal:  Negative for abdominal pain, constipation, diarrhea, heartburn, hematochezia and nausea.   Breasts:  Negative for tenderness, breast mass and discharge.   Genitourinary:  Negative for dysuria, frequency, genital sores, hematuria, pelvic pain, urgency, vaginal bleeding and vaginal discharge.   Musculoskeletal:  Negative for arthralgias, joint swelling and myalgias.   Skin:  Negative for rash.   Neurological:  Negative for dizziness, weakness, headaches and paresthesias.   Psychiatric/Behavioral:  Negative for mood changes. The patient is not nervous/anxious.         OBJECTIVE:   /71   Pulse 63   Temp 98.2  F (36.8  C)   Ht 1.754 m (5'  "9.06\")   Wt 61.3 kg (135 lb 1.3 oz)   LMP 07/26/2023   SpO2 98%   BMI 19.92 kg/m    Physical Exam  GENERAL: healthy, alert and no distress  EYES: Eyes grossly normal to inspection, PERRL and conjunctivae and sclerae normal  HENT: ear canals and TM's normal, nose and mouth without ulcers or lesions  NECK: no adenopathy, no asymmetry, masses, or scars and thyroid normal to palpation  RESP: lungs clear to auscultation - no rales, rhonchi or wheezes  CV: regular rate and rhythm, normal S1 S2, no S3 or S4, no murmur, click or rub, no peripheral edema and peripheral pulses strong  ABDOMEN: soft, nontender, no hepatosplenomegaly, no masses and bowel sounds normal  MS: no gross musculoskeletal defects noted, no edema  SKIN: no suspicious lesions or rashes  NEURO: Normal strength and tone, mentation intact and speech normal  PSYCH: mentation appears normal, affect normal/bright    Diagnostic Test Results:  Labs reviewed in Epic    ASSESSMENT/PLAN:     (Z00.00) Annual physical exam  (primary encounter diagnosis)  Comment: Age appropriates screening and preventive services provided.   Plan:     (F41.9) Anxiety  Comment: Chronic, stable. Considering another attempt to wean down on sertraline in the future. Can message me for 25mg tablet any time.   Plan: propranolol (INDERAL) 10 MG tablet, sertraline         (ZOLOFT) 50 MG tablet          (L70.0) Acne vulgaris  Comment: Chronic, stable. Decrease tretinoin from 0.05% to 0.025% due to excessive dryness.   Plan: spironolactone (ALDACTONE) 100 MG tablet,         tretinoin (RETIN-A) 0.025 % external cream          (Z13.220) Screening cholesterol level  Comment: Plan: Lipid panel reflex to direct LDL Non-fasting          (Z11.59) Encounter for hepatitis C screening test for low risk patient  Comment: Plan: Hepatitis C Screen Reflex to HCV RNA Quant and         Genotype          (G43.009) Migraine without aura and without status migrainosus, not intractable  Comment: Chronic, " stable. Improvements with propranolol and tolerating well. Follows with neurology.   Plan:     COUNSELING:  Reviewed preventive health counseling, as reflected in patient instructions        She reports that she has never smoked. She has never used smokeless tobacco.    MD JOSEPHINE [facility]

## 2023-07-28 ENCOUNTER — OFFICE VISIT (OUTPATIENT)
Dept: FAMILY MEDICINE | Facility: CLINIC | Age: 28
End: 2023-07-28
Payer: COMMERCIAL

## 2023-07-28 VITALS
SYSTOLIC BLOOD PRESSURE: 105 MMHG | HEIGHT: 69 IN | BODY MASS INDEX: 20.01 KG/M2 | HEART RATE: 63 BPM | TEMPERATURE: 98.2 F | OXYGEN SATURATION: 98 % | WEIGHT: 135.08 LBS | DIASTOLIC BLOOD PRESSURE: 71 MMHG

## 2023-07-28 DIAGNOSIS — Z00.00 ANNUAL PHYSICAL EXAM: Primary | ICD-10-CM

## 2023-07-28 DIAGNOSIS — Z11.59 ENCOUNTER FOR HEPATITIS C SCREENING TEST FOR LOW RISK PATIENT: ICD-10-CM

## 2023-07-28 DIAGNOSIS — Z13.220 SCREENING CHOLESTEROL LEVEL: ICD-10-CM

## 2023-07-28 DIAGNOSIS — L70.0 ACNE VULGARIS: ICD-10-CM

## 2023-07-28 DIAGNOSIS — F41.9 ANXIETY: ICD-10-CM

## 2023-07-28 DIAGNOSIS — G43.009 MIGRAINE WITHOUT AURA AND WITHOUT STATUS MIGRAINOSUS, NOT INTRACTABLE: ICD-10-CM

## 2023-07-28 PROCEDURE — 80061 LIPID PANEL: CPT | Performed by: FAMILY MEDICINE

## 2023-07-28 PROCEDURE — 86803 HEPATITIS C AB TEST: CPT | Performed by: FAMILY MEDICINE

## 2023-07-28 RX ORDER — SPIRONOLACTONE 100 MG/1
100 TABLET, FILM COATED ORAL DAILY
Qty: 90 TABLET | Refills: 3 | Status: SHIPPED | OUTPATIENT
Start: 2023-07-28

## 2023-07-28 RX ORDER — TRETINOIN 0.25 MG/G
CREAM TOPICAL AT BEDTIME
Qty: 45 G | Refills: 11 | Status: SHIPPED | OUTPATIENT
Start: 2023-07-28 | End: 2023-12-09

## 2023-07-28 RX ORDER — PROPRANOLOL HYDROCHLORIDE 10 MG/1
10 TABLET ORAL PRN
Qty: 30 TABLET | Refills: 11 | Status: SHIPPED | OUTPATIENT
Start: 2023-07-28 | End: 2023-12-09

## 2023-07-28 ASSESSMENT — ENCOUNTER SYMPTOMS
HEMATURIA: 0
NAUSEA: 0
HEARTBURN: 0
ABDOMINAL PAIN: 0
DIZZINESS: 0
CONSTIPATION: 0
PALPITATIONS: 0
MYALGIAS: 0
CHILLS: 0
WEAKNESS: 0
COUGH: 0
HEMATOCHEZIA: 0
SORE THROAT: 0
ARTHRALGIAS: 0
HEADACHES: 0
NERVOUS/ANXIOUS: 0
BREAST MASS: 0
SHORTNESS OF BREATH: 0
EYE PAIN: 0
JOINT SWELLING: 0
DYSURIA: 0
PARESTHESIAS: 0
FREQUENCY: 0
FEVER: 0
DIARRHEA: 0

## 2023-07-29 LAB
CHOLEST SERPL-MCNC: 159 MG/DL
HCV AB SERPL QL IA: NONREACTIVE
HDLC SERPL-MCNC: 66 MG/DL
LDLC SERPL CALC-MCNC: 80 MG/DL
NONHDLC SERPL-MCNC: 93 MG/DL
TRIGL SERPL-MCNC: 63 MG/DL

## 2023-07-31 RX ORDER — TRAZODONE HYDROCHLORIDE 50 MG/1
TABLET, FILM COATED ORAL
Qty: 45 TABLET | Refills: 3 | OUTPATIENT
Start: 2023-07-31

## 2023-07-31 RX ORDER — SPIRONOLACTONE 100 MG/1
TABLET, FILM COATED ORAL
Qty: 90 TABLET | Refills: 3 | OUTPATIENT
Start: 2023-07-31

## 2023-10-13 ENCOUNTER — MYC MEDICAL ADVICE (OUTPATIENT)
Dept: FAMILY MEDICINE | Facility: CLINIC | Age: 28
End: 2023-10-13

## 2023-10-13 DIAGNOSIS — F41.9 ANXIETY: ICD-10-CM

## 2023-10-13 RX ORDER — SERTRALINE HYDROCHLORIDE 25 MG/1
25 TABLET, FILM COATED ORAL DAILY
Qty: 90 TABLET | Refills: 3 | Status: SHIPPED | OUTPATIENT
Start: 2023-10-13 | End: 2023-12-09

## 2023-10-13 NOTE — CONFIDENTIAL NOTE
In the process of weaning off of sertraline, currently on 25mg daily  Refilling at this dose    Mann Chau MD on 10/13/2023 at 2:26 PM

## 2023-11-02 ENCOUNTER — TELEPHONE (OUTPATIENT)
Dept: ALLERGY | Facility: CLINIC | Age: 28
End: 2023-11-02
Payer: COMMERCIAL

## 2023-11-02 NOTE — TELEPHONE ENCOUNTER
Standardized panels  [x] Standard panel (40 tests)  [x] Preservatives & Antimicrobials (31 tests)  [x] Emulsifiers & Additives (25 tests)   [x] Perfumes/Flavours & Plants (25 tests)  [] Hairdresser panel (12 tests)  [x] Rubber Chemicals (22 tests)  [] Plastics (26 tests)  [] Colorants/Dyes/Food additives (20 tests)  [] Metals (implants/dental) (24 tests)  [] Local anaesthetics/NSAIDs (13 tests)  [] Antibiotics & Antimycotics (14 tests)   [] Corticosteroids (15 tests)   [] Photopatch test (62 tests)   [] others: ...                         [x] Patient's own products: ...  DO NOT test if chemical or biological identity is unknown!   always ask from patient the product information and safety sheets (MSDS)     STANDARD Series                                          # Substance 2 days 4 days remarks     1 Andrzej Mix [C] - -       2 Colophony - -       3  2-Mercaptobenzothiazole  - -       4 Methylisothiazolinone - -       5 Carba Mix - -       6 Thiuram Mix [A] - -       7 Bisphenol A Epoxy Resin - -       8 H-Fiwa-Qhlqsbosxyg-Formaldehyde Resin - -       9 Mercapto Mix [A] - -       10 Black Rubber Mix- PPD [B] - -       11 Potassium Dichromate  -  -       12 Balsam of [place] (Myroxylon Pereirae Resin) - -       13 Nickel Sulphate Hexahydrate - -       14 Mixed Dialkyl Thiourea - -       15 Paraben Mix [B] - -       16 Methyldibromo Glutaronitrile - -       17 Fragrance Mix 8% - -       18 2-Bromo-2-Nitropropane-1,3-Diol (Bronopol) CT - -       19 Lyral - -       20 Tixocortol-21- Pivalate CT - -       21 Diazolidinyl urea (Germall II) - -        22 Methyl Methacrylate - -       23 Cobalt (II) Chloride Hexahydrate - -       24 Fragrance Mix II  - -       25 Compositae Mix - -       26 Benzoyl Peroxide - -       27 Bacitracin - -       28 Formaldehyde - -       29 Methylchloroisothiazolinone / Methylisothiazolinone - -       30 Corticosteroid Mix CT - -       31 Sodium Lauryl Sulfate - -       32 Lanolin Alcohol - -        33 Turpentine - -       34 Cetylstearylalcohol - -       35 Chlorhexidine Dicluconate - -       36 Budesonide - -       37 Imidazolidinyl Urea  - -       38 Ethyl-2 Cyanoacrylate - -       39 Quaternium 15 (Dowicil 200) - -       40 Decyl Glucoside - -      PRESERVATIVES & ANTIMICROBIALS        # Substance 2 days 4 days remarks   41 1  1,2-Benzisothiazoline-3-One, Sodium Salt - -     2  1,3,5-Alec (2-Hydroxyethyl) - Hexahydrotriazine (Grotan BK) - -     3 0-Fzqdlbqzdpfql-1-Nitro-1, 3-Propanediol - -     4  3, 4, 4' - Triclocarban - -    45 5 4 - Chloro - 3 - Cresol - -     6 4 - Chloro - 4 - Xylenol (PCMX) - -     7 7-Ethylbicyclooxazolidine (Bioban [id number]) - -     8 Benzalkonium Chloride CT - -     9 Benzyl Alcohol - -    50 10 Cetalkonium Chloride - -     11 Cetylpyrimidine Chloride  - -     12 Chloroacetamide - -     13 DMDM Hydantoin - -     14 Glutaraldehyde - -    55 15 Triclosan - -     16 Glyoxal Trimeric Dihydrate - -     17 Iodopropynyl Butylcarbamate - -     18 Octylisothiazoline - -     19 Bithionol CT - -    60 20 Bioban P 1487 (Nitrobutyl) Morpholine/(Ethylnitro-Trimethylene) Dimorpholine - -     21 Phenoxyethanol - -     22 Phenyl Salicylate - -     23 Povidone Iodine - -     24 Sodium Benzoate - -    65 25 Sodium Disulfite - -     26 Sorbic Acid - -     27 Thimerosal - -     28 Melamine Formaldehyde Resin - -     29 Ethylenediamine Dihydrochloride - -      Parabens      70 30 Butyl-P-Hydroxybenzoate - -     31 Ethyl-P-Hydroxybenzoate - -     32 Methyl-P-Hydroxybenzoate - -    73 33 Propyl-P-Hydroxybenzoate - -     EMULSIFIERS & ADDITIVES       # Substance 2 days 4 days remarks   74 1 Polyethylene Glycol-400 - -    75 2 Cocamidopropyl Betaine - -     3 Amerchol L101 - -     4 Propylene Glycol - -     5 Triethanolamine - -     6 Sorbitane Sesquiolate CT - -    80 7 Isopropylmyristate - -     8 Polysorbate 80 CT - -     9 Amidoamine   (Stearamidopropyl Dimethylamine) - -     10 Oleamidopropyl  Dimethylamine - -     11 Lauryl Glucoside - -    85 12 Coconut Diethanolamide  - -     13 2-Hydroxy-4-Methoxy Benzophenone (Oxybenzone) - -     14 Benzophenone-4 (Sulisobenzon) - -     15 Propolis - -     16 Dexpanthenol - -    90 17 Abitol - -     18 Tert-Butylhydroquinone - -     19 Benzyl Salicylate - -     20 Dimethylaminopropylamin (DMPA) - -     21 Zinc Pyrithione (Zinc Omadine)  - -    95 22 Alec(Hydroxymethyl) Nitromethane  - -      Antioxidant       23 Dodecyl Gallate - -     24 Butylhydroxyanisole (BHA) - -     25 Butylhydroxytoluene (BHT) - -     26 Di-Alpha-Tocopherol (Vit E) - -    100 27 Propyl Gallate - -     PERFUMES, FLAVORS & PLANTS        # Substance 2 days 4 days remarks   101 1 Benzyl Cinnamate - -     2 Di-Limonene (Dipentene) - -     3 Cananga Odorata (Rashard Wetzel) (I) - -     4 Lichen Acid Mix - -    105 5 Mentha Piperita Oil (Peppermint Oil) - -     6 Sesquiterpenelactone mix - -     7 Tea Tree Oil, Oxidized - -     8 Wood Tar Mix - -     9 Abietic Acid - -    110 10 Lavendula Angustifolia Oil (Lavender Oil) - -     11 Fragrance mix II CT * 14% - -      Fragrance Mix I       12 Oakmoss Absolute - -     13 Eugenol - -     14 Geraniol - -    115 15 Hydroxycitronellal - -     16 Isoeugenol - -     17 Cinnamic Aldehyde - -     18 Cinnamic Alcohol  - -      Fragrance mix II       19 Citronellol - -    120 20 Alpha-Hexylcinnamic Aldehyde    - -     21 Citral - -     22 Farnesol - -    123 23 Coumarin - -    Hexylcinnamic aldehyde, Coumarin, Farnesol, Hydroxyisohexy3-cyclohexene carboxaldehyde, citral, citrolellol   RUBBER CHEMICALS        # Substance 2 days 4 days remarks     Carbamate      124 1 Zinc Bis ( Diethyldithio carbamate ) (ZDEC) - -    125 2 Zinc Bis (Dibutyldithiocarbamate) - -     3 1,3-Diphenylguanidine (DPG) - -      Thiourea       4 Dibutylthiourea - -     5 Diphenyltiourea - -     6 Thiourea - -      Mercapto chemicals      130 7 Morpholinyl Mercaptobenzothiazole - -     8  "Z-Tjixbtwrvo-5-Benzothiazyl-Sulfenamide - -     9 Dibenzothiazyl Disulfide - -      Thiuram chemicals       10 Dipentamethylenethiuram Disulfide - -     11 Tetraethylthiuram Disulfide (Disulfiram) - -    135 12 Tetramethylthiuram Disulfide - -     13 Tetramethyl Thiuram Monosulfide (TMTM) - -      4-Phenylenediamine derivatives       14 N-Isopropyl-N'-Phenyl-P-Phenylenediamine (IPPD) - -     15 [place]-T-Shoxywufpovunzfj (DPPD) - -      Various Rubber Additives       16 Hydroquinone Monobenzylether  - -    140 17 Hexamethylenetetramine - -     18 4,4'-Dihydroxybiphenyl - -     19 Cyclohexylthiophtalimide - -    143 20 N-Phenyl-B-Naphthylamine - -    OTHER PRODUCTS        # Substance Conc  % solv 2 days 4 days remarks    1          2          3          4          5          6          7          8          9          10           Patients own products:  è DO NOT test if chemical or biological identity is unknown!   - always ask from patient the product information and safety sheets and consult with the physician   - check neutral pH with pH indicator paper (do not test pH below 5 or over 8 or consult with physician)  - leave-on cosmetics can be tested \"as is\"  - rinse-off products have to be diluted with water, buffer, olive oil or paraffin (discuss with physician)  à remember:   - non-specific toxic/corrosive or biological reactions can occur  - tests with patients own products are not standardized and test conditions are not optimized for patch tests. Whenever possible test with standardized test series and correlate use of product with the result of the patch tests  - if not sure if compounds can be tested under occlusion in patch tests consider open application tests     Results of patch tests:                         Interpretation:  - Negative                    A    = Allergic      (+) Erythema    TI   = Toxic/irritant   + E + Infiltration    RaP = Relevance at Present     ++ E/I + Papulovesicle   Rpr  = " Relevance Previously     +++ E/I/P + Blister     nR   = No Relevance

## 2023-11-05 NOTE — PROGRESS NOTES
[facility] Dermato-allergology Note  Office visit  Encounter Date: Nov 6, 2023  ____________________________________________    CC: No chief complaint on file.      HPI:  (Nov 6, 2023)  Ms. Naomy Mark is a(n) 28 year old female who presents today as a return patient for allergy tests as planned  - patient here for patch tests that have been discussed with her previously (is Dermatology resident)  - otherwise feeling well in usual state of health    Physical exam:  General: In no acute distress, well-developed, well-nourished  Eyes: no conjunctivitis  ENT: no signs of rhinitis   Pulmonary: no wheezing or coughing  Skin: Focused examination of the skin on test sites was performed = see test results below  No active eczematous skin lesions on tests sites, particularly back    Earlier History and Allergy exams:  n/a    RESULTS & EVALUATION of PATCH TESTS    Nov 6, 2023 application of patch tests:    Patch test readings after     [x] 2 days, [] 3 days [x] 4 days, [] 5 days,  Other duration: ...    Standardized panels  [x] Standard panel (40 tests)  [x] Preservatives & Antimicrobials (31 tests)  [x] Emulsifiers & Additives (25 tests)   [x] Perfumes/Flavours & Plants (25 tests)  [] Hairdresser panel (12 tests)  [x] Rubber Chemicals (22 tests)  [] Plastics (26 tests)  [] Colorants/Dyes/Food additives (20 tests)  [] Metals (implants/dental) (24 tests)  [] Local anaesthetics/NSAIDs (13 tests)  [] Antibiotics & Antimycotics (14 tests)   [] Corticosteroids (15 tests)   [] Photopatch test (62 tests)   [] others: ...                         [x] Patient's own products: ...  DO NOT test if chemical or biological identity is unknown!   always ask from patient the product information and safety sheets (MSDS)     STANDARD Series                                          # Substance 2 days 4 days remarks     1 Andrzej Mix [C] - -       2 Colophony - -       3  2-Mercaptobenzothiazole  - -       4  Methylisothiazolinone - -       5 Carba Mix - -       6 Thiuram Mix [A] - -       7 Bisphenol A Epoxy Resin - -       8 T-Ydsm-Xfebfmpvqdq-Formaldehyde Resin - -       9 Mercapto Mix [A] - -       10 Black Rubber Mix- PPD [B] - -       11 Potassium Dichromate  -  -       12 Balsam of [place] (Myroxylon Pereirae Resin) - -       13 Nickel Sulphate Hexahydrate - -       14 Mixed Dialkyl Thiourea - -       15 Paraben Mix [B] - -       16 Methyldibromo Glutaronitrile - -       17 Fragrance Mix 8% - -       18 2-Bromo-2-Nitropropane-1,3-Diol (Bronopol) CT - -       19 Lyral - -       20 Tixocortol-21- Pivalate CT - -       21 Diazolidinyl urea (Germall II) - -        22 Methyl Methacrylate - -       23 Cobalt (II) Chloride Hexahydrate - -       24 Fragrance Mix II  - -       25 Compositae Mix - -       26 Benzoyl Peroxide - -       27 Bacitracin - -       28 Formaldehyde - -       29 Methylchloroisothiazolinone / Methylisothiazolinone - -       30 Corticosteroid Mix CT - -       31 Sodium Lauryl Sulfate - -       32 Lanolin Alcohol - -       33 Turpentine - -       34 Cetylstearylalcohol - -       35 Chlorhexidine Dicluconate - -       36 Budesonide - -       37 Imidazolidinyl Urea  - -       38 Ethyl-2 Cyanoacrylate - -       39 Quaternium 15 (Dowicil 200) - -       40 Decyl Glucoside - -      PRESERVATIVES & ANTIMICROBIALS        # Substance 2 days 4 days remarks   41 1  1,2-Benzisothiazoline-3-One, Sodium Salt - -     2  1,3,5-Alec (2-Hydroxyethyl) - Hexahydrotriazine (Grotan BK) - -     3 7-Ppxinkpbcwsif-6-Nitro-1, 3-Propanediol NA NA     4  3, 4, 4' - Triclocarban - -    45 5 4 - Chloro - 3 - Cresol - -     6 4 - Chloro - 4 - Xylenol (PCMX) - -     7 7-Ethylbicyclooxazolidine (Bioban [id number]) - -     8 Benzalkonium Chloride CT - -     9 Benzyl Alcohol - -    50 10 Cetalkonium Chloride - -     11 Cetylpyrimidine Chloride  - -     12 Chloroacetamide - -     13 DMDM Hydantoin - -     14 Glutaraldehyde - -    55 15  Triclosan - -     16 Glyoxal Trimeric Dihydrate - -     17 Iodopropynyl Butylcarbamate - -     18 Octylisothiazoline - -     19 Bithionol CT NA NA    60 20 Bioban P 1487 (Nitrobutyl) Morpholine/(Ethylnitro-Trimethylene) Dimorpholine - -     21 Phenoxyethanol - -     22 Phenyl Salicylate - -     23 Povidone Iodine - -     24 Sodium Benzoate - -    65 25 Sodium Disulfite - -     26 Sorbic Acid - -     27 Thimerosal - -     28 Melamine Formaldehyde Resin - -     29 Ethylenediamine Dihydrochloride - -      Parabens      70 30 Butyl-P-Hydroxybenzoate - -     31 Ethyl-P-Hydroxybenzoate - -     32 Methyl-P-Hydroxybenzoate - -    73 33 Propyl-P-Hydroxybenzoate - -     EMULSIFIERS & ADDITIVES       # Substance 2 days 4 days remarks   74 1 Polyethylene Glycol-400 - -    75 2 Cocamidopropyl Betaine - -     3 Amerchol L101 - -     4 Propylene Glycol - -     5 Triethanolamine - -     6 Sorbitane Sesquiolate CT - -    80 7 Isopropylmyristate - -     8 Polysorbate 80 CT - -     9 Amidoamine   (Stearamidopropyl Dimethylamine) - -     10 Oleamidopropyl Dimethylamine - -     11 Lauryl Glucoside - -    85 12 Coconut Diethanolamide  - -     13 2-Hydroxy-4-Methoxy Benzophenone (Oxybenzone) - -     14 Benzophenone-4 (Sulisobenzon) NA NA     15 Propolis - -     16 Dexpanthenol - -    90 17 Abitol - -     18 Tert-Butylhydroquinone - -     19 Benzyl Salicylate - -     20 Dimethylaminopropylamin (DMPA) - -     21 Zinc Pyrithione (Zinc Omadine)  - -    95 22 Alec(Hydroxymethyl) Nitromethane  - -      Antioxidant       23 Dodecyl Gallate - -     24 Butylhydroxyanisole (BHA) - -     25 Butylhydroxytoluene (BHT) - -     26 Di-Alpha-Tocopherol (Vit E) - -    100 27 Propyl Gallate - -     PERFUMES, FLAVORS & PLANTS        # Substance 2 days 4 days remarks   101 1 Benzyl Cinnamate - -     2 Di-Limonene (Dipentene) - -     3 Cananga Odorata (Ylang Ylang) (I) - -     4 Lichen Acid Mix - -    105 5 Mentha Piperita Oil (Peppermint Oil) - -     6  Sesquiterpenelactone mix - -     7 Tea Tree Oil, Oxidized - -     8 Wood Tar Mix - -     9 Abietic Acid - -    110 10 Lavendula Angustifolia Oil (Lavender Oil) - -     11 Fragrance mix II CT * 14% - -      Fragrance Mix I       12 Oakmoss Absolute - -     13 Eugenol - -     14 Geraniol - -    115 15 Hydroxycitronellal - -     16 Isoeugenol - -     17 Cinnamic Aldehyde - -     18 Cinnamic Alcohol  - -      Fragrance mix II       19 Citronellol - -    120 20 Alpha-Hexylcinnamic Aldehyde    - -     21 Citral - -     22 Farnesol - -    123 23 Coumarin - -    Hexylcinnamic aldehyde, Coumarin, Farnesol, Hydroxyisohexy3-cyclohexene carboxaldehyde, citral, citrolellol   RUBBER CHEMICALS        # Substance 2 days 4 days remarks     Carbamate      124 1 Zinc Bis ( Diethyldithio carbamate ) (ZDEC) - -    125 2 Zinc Bis (Dibutyldithiocarbamate) - -     3 1,3-Diphenylguanidine (DPG) - -      Thiourea       4 Dibutylthiourea - -     5 Diphenyltiourea - -     6 Thiourea - -      Mercapto chemicals      130 7 Morpholinyl Mercaptobenzothiazole - -     8 S-Yaivlirptj-1-Benzothiazyl-Sulfenamide - -     9 Dibenzothiazyl Disulfide - -      Thiuram chemicals       10 Dipentamethylenethiuram Disulfide - -     11 Tetraethylthiuram Disulfide (Disulfiram) - -    135 12 Tetramethylthiuram Disulfide - -     13 Tetramethyl Thiuram Monosulfide (TMTM) - -      4-Phenylenediamine derivatives       14 N-Isopropyl-N'-Phenyl-P-Phenylenediamine (IPPD) - -     15 Ymfyjkox-A-Bcfhbggxcptrszmo (DPPD) - -      Various Rubber Additives       16 Hydroquinone Monobenzylether  - -    140 17 Hexamethylenetetramine - -     18 4,4'-Dihydroxybiphenyl - -     19 Cyclohexylthiophtalimide - -    143 20 N-Phenyl-B-Naphthylamine - -    OTHER PRODUCTS        # Substance Conc  % solv 2 days 4 days remarks    1 [employer] hand disinfection Semi-occlusive As is       2 [employer] hand disinfection Semi-occlusive As is        Patients own products:  è DO NOT test if chemical or  "biological identity is unknown!   - always ask from patient the product information and safety sheets and consult with the physician   - check neutral pH with pH indicator paper (do not test pH below 5 or over 8 or consult with physician)  - leave-on cosmetics can be tested \"as is\"  - rinse-off products have to be diluted with water, buffer, olive oil or paraffin (discuss with physician)  à remember:   - non-specific toxic/corrosive or biological reactions can occur  - tests with patients own products are not standardized and test conditions are not optimized for patch tests. Whenever possible test with standardized test series and correlate use of product with the result of the patch tests  - if not sure if compounds can be tested under occlusion in patch tests consider open application tests     Results of patch tests:                         Interpretation:  - Negative                    A    = Allergic      (+) Erythema    TI   = Toxic/irritant   + E + Infiltration    RaP = Relevance at Present     ++ E/I + Papulovesicle   Rpr  = Relevance Previously     +++ E/I/P + Blister     nR   = No Relevance     Atopy Screen (Placed Nov 6, 2023)  No Substance Readings (15 min) Evaluation   POS Histamine 1mg/ml +/++    NEG NaCl 0.9% -      No Substance Readings (15 min) Evaluation   1 Alternaria alternata (tenuis)  ++    2 Cladosporium herbarum ++    3 Aspergillus fumigatus -    4 Penicillium notatum -    5 Dermatophagoides pteronyssinus +++    6 Dermatophagoides farinae +++    7 Dog epithelium (canis spp) -    8 Cat hair (cyndy catus) +    9 Cockroach   (Blatella americana & germanica) -    10 Grass mix midwest   (Fanta, Orchard, Redtop, Boom) -    11 Jeff grass (sorghum halepense) -    12 Weed mix   (common Cocklebur, Lamb s quarters, rough redroot Pigweed, Dock/Sorrel) -    13 Mug wort (artemisia vulgare) -    14 Ragweed giant/short (ambrosia spp) +/++    15 White birch (Betula papyrifera) -    16 Tree mix 1 (Pecan, " Maple BHR, [place] RVW, american [place], black [place]) -    17 Red cedar (juniperus virginia) -    18 Tree mix 2   (white [place], river/red Birch, black [place], common [place], american Elm) -    19 Box elder/Maple mix (acer spp) -    20 [place] shagbark (carya ovata) -           Conclusion: clearly atopic with sensitization to seasonal molds, ragweed and dust mites     [] No relevant allergic reaction observed    [] Allergic reaction diagnosed against following allergens:      Interpretation/ remarks:   See later    [] Patient information given   [] ACDS CAMP information's (# ....) to following compounds: .....   [] General information's to following compounds: ......      Assessment & Plan:    ==> Final Diagnosis:   # ruling out allergic contact dermatitis with recurrent hand dermatitis (occupational?)  * chronic illness with exacerbation, progression, side effects from treatment    # atopic predisposition with  Perennial rhinitis with sensitization to dust mites  Seasonal aggravation in spring and fall (seasonal molds and ragweed)  * stable chronic illness      These conclusions are made at the best of one's knowledge and belief based on the provided evidence such as patient's history and allergy test results and they can change over time or can be incomplete because of missing information's.    ==> Treatment Plan:  See later     Procedures Performed: Allergy tests, including prick and patch tests     Staff: : provider    Follow-up in Derm-Allergy clinic for 1st readings of patch tests after 2 days and 2nd readings and final conclusions after 4 days   ___________________________    I spent a total of 24 minutes with Naomy Mark during today s  visit. This time was spent discussing all the individual test results, correlating them to the clinical relevance, counseling the patient and/or coordinating care and performing allergy tests.

## 2023-11-06 ENCOUNTER — OFFICE VISIT (OUTPATIENT)
Dept: ALLERGY | Facility: CLINIC | Age: 28
End: 2023-11-06
Payer: OTHER MISCELLANEOUS

## 2023-11-06 DIAGNOSIS — Z88.9 ATOPY: ICD-10-CM

## 2023-11-06 DIAGNOSIS — L23.5 ALLERGIC DERMATITIS DUE TO OTHER CHEMICAL PRODUCT: Primary | ICD-10-CM

## 2023-11-06 DIAGNOSIS — Z91.048 ALLERGY TO MOLD: ICD-10-CM

## 2023-11-06 DIAGNOSIS — L30.1 DYSHIDROTIC HAND DERMATITIS: ICD-10-CM

## 2023-11-06 DIAGNOSIS — Z91.09 HOUSE DUST MITE ALLERGY: ICD-10-CM

## 2023-11-06 DIAGNOSIS — J30.2 SEASONAL ALLERGIC RHINITIS DUE TO FUNGAL SPORES: ICD-10-CM

## 2023-11-06 DIAGNOSIS — L20.89 OTHER ATOPIC DERMATITIS: ICD-10-CM

## 2023-11-06 DIAGNOSIS — L23.9 OCCUPATIONAL ALLERGIC CONTACT DERMATITIS: ICD-10-CM

## 2023-11-06 PROCEDURE — 95004 PERQ TESTS W/ALRGNC XTRCS: CPT | Performed by: DERMATOLOGY

## 2023-11-06 PROCEDURE — 95044 PATCH/APPLICATION TESTS: CPT | Mod: 59 | Performed by: DERMATOLOGY

## 2023-11-06 ASSESSMENT — PAIN SCALES - GENERAL: PAINLEVEL: NO PAIN (0)

## 2023-11-06 NOTE — NURSING NOTE
Dermatology Rooming Note    Naomy Mark's goals for this visit include:   Chief Complaint   Patient presents with    Patch testing     Naomy Is here today for patch testing      PATITO Palumbo

## 2023-11-06 NOTE — LETTER
11/6/2023         RE: Naomy Mark  [address]  [address]  [facility] [address]        Dear Colleague,    Thank you for referring your patient, Naomy Mark, to the [facility] [place]. Please see a copy of my visit note below.    [facility] Dermato-allergology Note  Office visit  Encounter Date: Nov 6, 2023  ____________________________________________    CC: No chief complaint on file.      HPI:  (Nov 6, 2023)  Ms. Naomy Mark is a(n) 28 year old female who presents today as a return patient for allergy tests as planned  - patient here for patch tests that have been discussed with her previously (is Dermatology resident)  - otherwise feeling well in usual state of health    Physical exam:  General: In no acute distress, well-developed, well-nourished  Eyes: no conjunctivitis  ENT: no signs of rhinitis   Pulmonary: no wheezing or coughing  Skin: Focused examination of the skin on test sites was performed = see test results below  No active eczematous skin lesions on tests sites, particularly back    Earlier History and Allergy exams:  n/a    RESULTS & EVALUATION of PATCH TESTS    Nov 6, 2023 application of patch tests:    Patch test readings after     [x] 2 days, [] 3 days [x] 4 days, [] 5 days,  Other duration: ...    Standardized panels  [x] Standard panel (40 tests)  [x] Preservatives & Antimicrobials (31 tests)  [x] Emulsifiers & Additives (25 tests)   [x] Perfumes/Flavours & Plants (25 tests)  [] Hairdresser panel (12 tests)  [x] Rubber Chemicals (22 tests)  [] Plastics (26 tests)  [] Colorants/Dyes/Food additives (20 tests)  [] Metals (implants/dental) (24 tests)  [] Local anaesthetics/NSAIDs (13 tests)  [] Antibiotics & Antimycotics (14 tests)   [] Corticosteroids (15 tests)   [] Photopatch test (62 tests)   [] others: ...                         [x] Patient's own products: ...  DO NOT test if chemical or biological identity is  unknown!   always ask from patient the product information and safety sheets (MSDS)     STANDARD Series                                          # Substance 2 days 4 days remarks     1 Andrzej Mix [C] - -       2 Colophony - -       3  2-Mercaptobenzothiazole  - -       4 Methylisothiazolinone - -       5 Carba Mix - -       6 Thiuram Mix [A] - -       7 Bisphenol A Epoxy Resin - -       8 D-Nlze-Fbxdgpdhdgs-Formaldehyde Resin - -       9 Mercapto Mix [A] - -       10 Black Rubber Mix- PPD [B] - -       11 Potassium Dichromate  -  -       12 Balsam of [place] (Myroxylon Pereirae Resin) - -       13 Nickel Sulphate Hexahydrate - -       14 Mixed Dialkyl Thiourea - -       15 Paraben Mix [B] - -       16 Methyldibromo Glutaronitrile - -       17 Fragrance Mix 8% - -       18 2-Bromo-2-Nitropropane-1,3-Diol (Bronopol) CT - -       19 Lyral - -       20 Tixocortol-21- Pivalate CT - -       21 Diazolidinyl urea (Germall II) - -        22 Methyl Methacrylate - -       23 Cobalt (II) Chloride Hexahydrate - -       24 Fragrance Mix II  - -       25 Compositae Mix - -       26 Benzoyl Peroxide - -       27 Bacitracin - -       28 Formaldehyde - -       29 Methylchloroisothiazolinone / Methylisothiazolinone - -       30 Corticosteroid Mix CT - -       31 Sodium Lauryl Sulfate - -       32 Lanolin Alcohol - -       33 Turpentine - -       34 Cetylstearylalcohol - -       35 Chlorhexidine Dicluconate - -       36 Budesonide - -       37 Imidazolidinyl Urea  - -       38 Ethyl-2 Cyanoacrylate - -       39 Quaternium 15 (Dowicil 200) - -       40 Decyl Glucoside - -      PRESERVATIVES & ANTIMICROBIALS        # Substance 2 days 4 days remarks   41 1  1,2-Benzisothiazoline-3-One, Sodium Salt - -     2  1,3,5-Alec (2-Hydroxyethyl) - Hexahydrotriazine (Grotan BK) - -     3 8-Hrazcvftqpomb-1-Nitro-1, 3-Propanediol NA NA     4  3, 4, 4' - Triclocarban - -    45 5 4 - Chloro - 3 - Cresol - -     6 4 - Chloro - 4 - Xylenol (PCMX) - -     7  7-Ethylbicyclooxazolidine (Bioban HI0896) - -     8 Benzalkonium Chloride CT - -     9 Benzyl Alcohol - -    50 10 Cetalkonium Chloride - -     11 Cetylpyrimidine Chloride  - -     12 Chloroacetamide - -     13 DMDM Hydantoin - -     14 Glutaraldehyde - -    55 15 Triclosan - -     16 Glyoxal Trimeric Dihydrate - -     17 Iodopropynyl Butylcarbamate - -     18 Octylisothiazoline - -     19 Bithionol CT NA NA    60 20 Bioban P 1487 (Nitrobutyl) Morpholine/(Ethylnitro-Trimethylene) Dimorpholine - -     21 Phenoxyethanol - -     22 Phenyl Salicylate - -     23 Povidone Iodine - -     24 Sodium Benzoate - -    65 25 Sodium Disulfite - -     26 Sorbic Acid - -     27 Thimerosal - -     28 Melamine Formaldehyde Resin - -     29 Ethylenediamine Dihydrochloride - -      Parabens      70 30 Butyl-P-Hydroxybenzoate - -     31 Ethyl-P-Hydroxybenzoate - -     32 Methyl-P-Hydroxybenzoate - -    73 33 Propyl-P-Hydroxybenzoate - -     EMULSIFIERS & ADDITIVES       # Substance 2 days 4 days remarks   74 1 Polyethylene Glycol-400 - -    75 2 Cocamidopropyl Betaine - -     3 Amerchol L101 - -     4 Propylene Glycol - -     5 Triethanolamine - -     6 Sorbitane Sesquiolate CT - -    80 7 Isopropylmyristate - -     8 Polysorbate 80 CT - -     9 Amidoamine   (Stearamidopropyl Dimethylamine) - -     10 Oleamidopropyl Dimethylamine - -     11 Lauryl Glucoside - -    85 12 Coconut Diethanolamide  - -     13 2-Hydroxy-4-Methoxy Benzophenone (Oxybenzone) - -     14 Benzophenone-4 (Sulisobenzon) NA NA     15 Propolis - -     16 Dexpanthenol - -    90 17 Abitol - -     18 Tert-Butylhydroquinone - -     19 Benzyl Salicylate - -     20 Dimethylaminopropylamin (DMPA) - -     21 Zinc Pyrithione (Zinc Omadine)  - -    95 22 Alec(Hydroxymethyl) Nitromethane  - -      Antioxidant       23 Dodecyl Gallate - -     24 Butylhydroxyanisole (BHA) - -     25 Butylhydroxytoluene (BHT) - -     26 Di-Alpha-Tocopherol (Vit E) - -    100 27 Propyl Gallate - -      PERFUMES, FLAVORS & PLANTS        # Substance 2 days 4 days remarks   101 1 Benzyl Cinnamate - -     2 Di-Limonene (Dipentene) - -     3 Cananga Odorata (Rashard Wetzel) (I) - -     4 Lichen Acid Mix - -    105 5 Mentha Piperita Oil (Peppermint Oil) - -     6 Sesquiterpenelactone mix - -     7 Tea Tree Oil, Oxidized - -     8 Wood Tar Mix - -     9 Abietic Acid - -    110 10 Lavendula Angustifolia Oil (Lavender Oil) - -     11 Fragrance mix II CT * 14% - -      Fragrance Mix I       12 Oakmoss Absolute - -     13 Eugenol - -     14 Geraniol - -    115 15 Hydroxycitronellal - -     16 Isoeugenol - -     17 Cinnamic Aldehyde - -     18 Cinnamic Alcohol  - -      Fragrance mix II       19 Citronellol - -    120 20 Alpha-Hexylcinnamic Aldehyde    - -     21 Citral - -     22 Farnesol - -    123 23 Coumarin - -    Hexylcinnamic aldehyde, Coumarin, Farnesol, Hydroxyisohexy3-cyclohexene carboxaldehyde, citral, citrolellol   RUBBER CHEMICALS        # Substance 2 days 4 days remarks     Carbamate      124 1 Zinc Bis ( Diethyldithio carbamate ) (ZDEC) - -    125 2 Zinc Bis (Dibutyldithiocarbamate) - -     3 1,3-Diphenylguanidine (DPG) - -      Thiourea       4 Dibutylthiourea - -     5 Diphenyltiourea - -     6 Thiourea - -      Mercapto chemicals      130 7 Morpholinyl Mercaptobenzothiazole - -     8 W-Bprzyqwfsl-7-Benzothiazyl-Sulfenamide - -     9 Dibenzothiazyl Disulfide - -      Thiuram chemicals       10 Dipentamethylenethiuram Disulfide - -     11 Tetraethylthiuram Disulfide (Disulfiram) - -    135 12 Tetramethylthiuram Disulfide - -     13 Tetramethyl Thiuram Monosulfide (TMTM) - -      4-Phenylenediamine derivatives       14 N-Isopropyl-N'-Phenyl-P-Phenylenediamine (IPPD) - -     15 Cruewcfm-M-Kxdloxwbrboblbhm (DPPD) - -      Various Rubber Additives       16 Hydroquinone Monobenzylether  - -    140 17 Hexamethylenetetramine - -     18 4,4'-Dihydroxybiphenyl - -     19 Cyclohexylthiophtalimide - -    143 20  "N-Phenyl-B-Naphthylamine - -    OTHER PRODUCTS        # Substance Conc  % solv 2 days 4 days remarks    1 [employer] hand disinfection Semi-occlusive As is       2 [employer] hand disinfection Semi-occlusive As is        Patients own products:  è DO NOT test if chemical or biological identity is unknown!   - always ask from patient the product information and safety sheets and consult with the physician   - check neutral pH with pH indicator paper (do not test pH below 5 or over 8 or consult with physician)  - leave-on cosmetics can be tested \"as is\"  - rinse-off products have to be diluted with water, buffer, olive oil or paraffin (discuss with physician)  à remember:   - non-specific toxic/corrosive or biological reactions can occur  - tests with patients own products are not standardized and test conditions are not optimized for patch tests. Whenever possible test with standardized test series and correlate use of product with the result of the patch tests  - if not sure if compounds can be tested under occlusion in patch tests consider open application tests     Results of patch tests:                         Interpretation:  - Negative                    A    = Allergic      (+) Erythema    TI   = Toxic/irritant   + E + Infiltration    RaP = Relevance at Present     ++ E/I + Papulovesicle   Rpr  = Relevance Previously     +++ E/I/P + Blister     nR   = No Relevance     Atopy Screen (Placed Nov 6, 2023)  No Substance Readings (15 min) Evaluation   POS Histamine 1mg/ml +/++    NEG NaCl 0.9% -      No Substance Readings (15 min) Evaluation   1 Alternaria alternata (tenuis)  ++    2 Cladosporium herbarum ++    3 Aspergillus fumigatus -    4 Penicillium notatum -    5 Dermatophagoides pteronyssinus +++    6 Dermatophagoides farinae +++    7 Dog epithelium (canis spp) -    8 Cat hair (cyndy catus) +    9 Cockroach   (Blatella americana & germanica) -    10 Grass mix midwest   (Fanta, Orchard, Redtop, Boom) -    11 Jeff " grass (sorghum halepense) -    12 Weed mix   (common Cocklebur, Lamb s quarters, rough redroot Pigweed, Dock/Sorrel) -    13 Mug wort (artemisia vulgare) -    14 Ragweed giant/short (ambrosia spp) +/++    15 White birch (Betula papyrifera) -    16 Tree mix 1 (Pecan, Maple BHR, Oak RVW, american [place], black [place]) -    17 Red cedar (juniperus virginia) -    18 Tree mix 2   (white [place], river/red Birch, black [place], common [place], american Elm) -    19 Box elder/Maple mix (acer spp) -    20 [place] shagbark (carya ovata) -           Conclusion: clearly atopic with sensitization to seasonal molds, ragweed and dust mites     [] No relevant allergic reaction observed    [] Allergic reaction diagnosed against following allergens:      Interpretation/ remarks:   See later    [] Patient information given   [] ACDS CAMP information's (# ....) to following compounds: .....   [] General information's to following compounds: ......      Assessment & Plan:    ==> Final Diagnosis:   # ruling out allergic contact dermatitis with recurrent hand dermatitis (occupational?)  * chronic illness with exacerbation, progression, side effects from treatment    # atopic predisposition with  Perennial rhinitis with sensitization to dust mites  Seasonal aggravation in spring and fall (seasonal molds and ragweed)  * stable chronic illness      These conclusions are made at the best of one's knowledge and belief based on the provided evidence such as patient's history and allergy test results and they can change over time or can be incomplete because of missing information's.    ==> Treatment Plan:  See later     Procedures Performed: Allergy tests, including prick and patch tests     Staff: : provider    Follow-up in Derm-Allergy clinic for 1st readings of patch tests after 2 days and 2nd readings and final conclusions after 4 days   ___________________________    I spent a total of 24 minutes with Naomy Mark during today s   visit. This time was spent discussing all the individual test results, correlating them to the clinical relevance, counseling the patient and/or coordinating care and performing allergy tests.           Again, thank you for allowing me to participate in the care of your patient.        Sincerely,        Nathan Petersen MD

## 2023-11-06 NOTE — PATIENT INSTRUCTIONS
House Dust Mite Allergy        The house dust mite is an arachnid about 0.3 mm in size and not visible to the naked eye. There are around 150 species of house dust mites in the world. One mite produces up to 40 fecal droppings a day. One teaspoonful of bedroom dust contains an average of nearly 1000 mites and 250,000 minute droppings.    Causes and triggers of house dust mite allergy  The house dust mite requires a warm, moist environment without light in order to live and reproduce. Our beds are ideal. The mite feeds on human and animal skin scales. The allergen is mainly contained in the mite's feces. The feces contain allergy-triggering constituents which are spread in fine dust, are breathed in and can cause an allergic reaction.    Symptoms  When the allergens come into contact with the mucous membranes in the eyes, nose, mouth and throat, sufferers develop symptoms typical of an allergic cold (allergic rhinitis) or an allergic inflammation of the conjunctiva (allergic conjunctivitis): blocked or runny nose, sneezing, red, itchy eyes. If all of these symptoms are present, then the condition is also known as rhinoconjunctivitis. Often, the upper respiratory tract becomes chronically inflamed, primarily because house dust mites are present all year round.  The symptoms of house dust mite allergy typically occur in the morning and are more frequent in the cold months of the year.    Therapy and treatment  As a first step, mattress, pillows and duvet/comforter should be placed in mite-proof or anti-mite covers, sometimes known as encasings. Alternatively you can use pillows or comforter that can be washed at over 130 F monthly. At the same time, house dust should be minimized. If necessary, the symptoms can be treated with medication, for example antihistamines in the form of nasal sprays, eye drops and tablets. Desensitization/specific immunotherapy (SIT) is recommended for house dust allergy if all the measures  "above are not sufficient.    Tips and tricks:  Keep room temperature at 66-70 F and relative air humidity at a maximum of 50%.  Ideally, thoroughly air your home two to three times a day for 5 to 10 minutes each time.  Wash bed linens in at least 130 F every week.  Remove stuffed animals or freeze them every other week.  Keep ceiling fans off in the bedroom as they can stir up dust mite allergens.  Remove dust from furniture with a damp cloth and regularly wet mop floors.  Do not put pot and hydroponic plants in the bedroom and also avoid putting too many in living areas, as they increase room humidity.  When staying overnight in other accommodations, we recommend taking your own bed linen and the above anti-mite mattress covers with you.  Remove upholstered furniture from the bedroom and consider removing the carpets. Ideally, use sealed parquet or laminate kimberly, cork tiles or kimberly made of wood, novilon or PVC.  Maybe additionally reduce dust mites in mattress, upholstery, or kimberly using hot steam .      Modified from \"House Dust Mite Allergy\" by [employer] [facility] [place].     Mold Allergy    Molds are thread-like micro-fungi - they occur all over the world and grow particularly in places where there is moisture. If living spaces are infested with mold, it must be removed quickly. It may pose a health risk.    Triggers of mold allergy   The most important known allergens among fungi are Aspergillus species, Alternaria alternata, Cladosporium species and Penicillium species, which all belong to the category of molds. Molds are found all over the world, but predominantly in nature, mainly in the soil, in dead organic matter. Indoors molds develop in places where it is damp, such as areas affected by leaks, in gilberto cracks or when the relative air humidity is high. Poorly maintained ventilation systems, air humidifiers, aquariums, or decorative fountains and a lot of plants in a room can also lead " "to a mold infestation.    Symptoms   Mold allergy, like other respiratory allergies, is manifest as allergic runny nose, streaming eyes, cough and shortness of breath and is frequently accompanied by asthma. As well as allergic reactions, molds also cause irritation of the airways, the mucous membranes of the eyes and the skin. The growth of mold is often associated with a distinctively musty smell of earth, damp and mushrooms, which additionally impairs well-being.    Therapy and treatment   Any fungal infestation in living areas must be removed rapidly and properly. It is always important to get rid of the cause, hence eradicate the damp problem. Otherwise the mold will quickly reappear. People with health problems should not remove even small patches of mold growth. Before the mold is cleared and in the weeks following its clearance, rooms should also be aired frequently and dust should be removed.    Tips and tricks:   Keep relative air humidity to a maximum of 45 per cent in winter  Keep temperature between 66 and 73 C in winter  Air rooms thoroughly for five to ten minutes twice to three times a day  Position furniture at least ten centimetres away from walls  No plants in the bedroom area  Dispose of compost or organic waste on a daily basis or store temporarily outside the home  Do not use air humidifiers or only use them if air humidity is continuously monitored        Modified from \"Mould Allergy\" by [employer] [facility] [place].   "

## 2023-11-08 ENCOUNTER — ALLIED HEALTH/NURSE VISIT (OUTPATIENT)
Dept: ALLERGY | Facility: CLINIC | Age: 28
End: 2023-11-08
Payer: COMMERCIAL

## 2023-11-08 DIAGNOSIS — L23.5 ALLERGIC DERMATITIS DUE TO OTHER CHEMICAL PRODUCT: Primary | ICD-10-CM

## 2023-11-08 PROCEDURE — 99207 PR NO CHARGE NURSE ONLY: CPT | Performed by: DERMATOLOGY

## 2023-11-08 NOTE — NURSING NOTE
Naomy REINA Mark comes into clinic today at the request of Dr. Petersen Ordering Provider for patch day 3.    Patches in question:   5, 63, 88, 126    This service provided today was under the supervising provider of the day Dr. Petersen, who was available if needed.    Antonella Washington RN

## 2023-11-09 NOTE — PROGRESS NOTES
[facility] Dermato-allergology Note  Office visit  Encounter Date: Nov 10, 2023  ____________________________________________    CC: No chief complaint on file.      HPI:  (Nov 10, 2023)  Ms. Naomy Mark is a(n) 28 year old female who presents today as a return patient for allergy consultation  - Follow-up in Derm-Allergy clinic for 2nd readings of patch tests after 4 days   - otherwise feeling well in usual state of health    Physical exam:  General: In no acute distress, well-developed, well-nourished  Eyes: no conjunctivitis  ENT: no signs of rhinitis   Pulmonary: no wheezing or coughing  Skin:Focused examination of the skin on test sites was performed = see test results below    Earlier History and Allergy exams:  - patient here for patch tests that have been discussed with her previously (is Dermatology resident)    RESULTS & EVALUATION of PATCH TESTS    Nov 6, 2023 application of patch tests:    Patch test readings after     [x] 2 days, [] 3 days [x] 4 days, [] 5 days,  Other duration: ...    Standardized panels  [x] Standard panel (40 tests)  [x] Preservatives & Antimicrobials (31 tests)  [x] Emulsifiers & Additives (25 tests)   [x] Perfumes/Flavours & Plants (25 tests)  [] Hairdresser panel (12 tests)  [x] Rubber Chemicals (22 tests)  [] Plastics (26 tests)  [] Colorants/Dyes/Food additives (20 tests)  [] Metals (implants/dental) (24 tests)  [] Local anaesthetics/NSAIDs (13 tests)  [] Antibiotics & Antimycotics (14 tests)   [] Corticosteroids (15 tests)   [] Photopatch test (62 tests)   [] others: ...                         [x] Patient's own products: ...  DO NOT test if chemical or biological identity is unknown!   always ask from patient the product information and safety sheets (MSDS)     STANDARD Series                                          # Substance 2 days 4 days remarks     1 Andrzej Mix [C] - -       2 Colophony - -       3  2-Mercaptobenzothiazole  - -       4  Methylisothiazolinone - -       5 Carba Mix - ++       6 Thiuram Mix [A] - -       7 Bisphenol A Epoxy Resin - -       8 B-Shqs-Agwcvmorxbp-Formaldehyde Resin - -       9 Mercapto Mix [A] - -       10 Black Rubber Mix- PPD [B] - -       11 Potassium Dichromate  -  -       12 Balsam of [place] (Myroxylon Pereirae Resin) - -       13 Nickel Sulphate Hexahydrate - -       14 Mixed Dialkyl Thiourea - -       15 Paraben Mix [B] - -       16 Methyldibromo Glutaronitrile - -       17 Fragrance Mix 8% - -       18 2-Bromo-2-Nitropropane-1,3-Diol (Bronopol) CT - -       19 Lyral - -       20 Tixocortol-21- Pivalate CT - -       21 Diazolidinyl urea (Germall II) - -        22 Methyl Methacrylate - -       23 Cobalt (II) Chloride Hexahydrate - -       24 Fragrance Mix II  - -       25 Compositae Mix - -       26 Benzoyl Peroxide - -       27 Bacitracin - +/++       28 Formaldehyde - -       29 Methylchloroisothiazolinone / Methylisothiazolinone - -       30 Corticosteroid Mix CT - -       31 Sodium Lauryl Sulfate - -       32 Lanolin Alcohol - -       33 Turpentine - -       34 Cetylstearylalcohol - -       35 Chlorhexidine Dicluconate - -       36 Budesonide - -       37 Imidazolidinyl Urea  - -       38 Ethyl-2 Cyanoacrylate - -       39 Quaternium 15 (Dowicil 200) - -       40 Decyl Glucoside - -      PRESERVATIVES & ANTIMICROBIALS        # Substance 2 days 4 days remarks   41 1  1,2-Benzisothiazoline-3-One, Sodium Salt - -     2  1,3,5-Alec (2-Hydroxyethyl) - Hexahydrotriazine (Grotan BK) - -     3 1-Fncaaowgtofde-6-Nitro-1, 3-Propanediol NA NA     4  3, 4, 4' - Triclocarban - -    45 5 4 - Chloro - 3 - Cresol - -     6 4 - Chloro - 4 - Xylenol (PCMX) - -     7 7-Ethylbicyclooxazolidine (Bioban [id number]) - -     8 Benzalkonium Chloride CT - -     9 Benzyl Alcohol - -    50 10 Cetalkonium Chloride - -     11 Cetylpyrimidine Chloride  - -     12 Chloroacetamide - -     13 DMDM Hydantoin - -     14 Glutaraldehyde - -    55 15  Triclosan - -     16 Glyoxal Trimeric Dihydrate - -     17 Iodopropynyl Butylcarbamate - -     18 Octylisothiazoline - -     19 Bithionol CT NA NA    60 20 Bioban P 1487 (Nitrobutyl) Morpholine/(Ethylnitro-Trimethylene) Dimorpholine - -     21 Phenoxyethanol - -     22 Phenyl Salicylate - -     23 Povidone Iodine - +     24 Sodium Benzoate - -    65 25 Sodium Disulfite - -     26 Sorbic Acid - -     27 Thimerosal - -     28 Melamine Formaldehyde Resin - -     29 Ethylenediamine Dihydrochloride - -      Parabens      70 30 Butyl-P-Hydroxybenzoate - -     31 Ethyl-P-Hydroxybenzoate - -     32 Methyl-P-Hydroxybenzoate - -    73 33 Propyl-P-Hydroxybenzoate - -     EMULSIFIERS & ADDITIVES       # Substance 2 days 4 days remarks   74 1 Polyethylene Glycol-400 - -    75 2 Cocamidopropyl Betaine - -     3 Amerchol L101 - -     4 Propylene Glycol - -     5 Triethanolamine - -     6 Sorbitane Sesquiolate CT - -    80 7 Isopropylmyristate - -     8 Polysorbate 80 CT - -     9 Amidoamine   (Stearamidopropyl Dimethylamine) - -     10 Oleamidopropyl Dimethylamine - -     11 Lauryl Glucoside - -    85 12 Coconut Diethanolamide  - -     13 2-Hydroxy-4-Methoxy Benzophenone (Oxybenzone) - -     14 Benzophenone-4 (Sulisobenzon) NA NA     15 Propolis - +/++     16 Dexpanthenol - -    90 17 Abitol - -     18 Tert-Butylhydroquinone - -     19 Benzyl Salicylate - -     20 Dimethylaminopropylamin (DMPA) - -     21 Zinc Pyrithione (Zinc Omadine)  - -    95 22 Alec(Hydroxymethyl) Nitromethane  - -      Antioxidant       23 Dodecyl Gallate - -     24 Butylhydroxyanisole (BHA) - -     25 Butylhydroxytoluene (BHT) - -     26 Di-Alpha-Tocopherol (Vit E) - -    100 27 Propyl Gallate - -     PERFUMES, FLAVORS & PLANTS        # Substance 2 days 4 days remarks   101 1 Benzyl Cinnamate - -     2 Di-Limonene (Dipentene) - -     3 Cananga Odorata (Ylang Ylang) (I) - -     4 Lichen Acid Mix - -    105 5 Mentha Piperita Oil (Peppermint Oil) - -     6  Sesquiterpenelactone mix - -     7 Tea Tree Oil, Oxidized - -     8 Wood Tar Mix - (+)     9 Abietic Acid - -    110 10 Lavendula Angustifolia Oil (Lavender Oil) - -     11 Fragrance mix II CT * 14% - -      Fragrance Mix I       12 [place] Absolute - -     13 Eugenol - -     14 Geraniol - -    115 15 Hydroxycitronellal - -     16 Isoeugenol - -     17 Cinnamic Aldehyde - -     18 Cinnamic Alcohol  - -      Fragrance mix II       19 Citronellol - -    120 20 Alpha-Hexylcinnamic Aldehyde    - -     21 Citral - -     22 Farnesol - -    123 23 Coumarin - -    Hexylcinnamic aldehyde, Coumarin, Farnesol, Hydroxyisohexy3-cyclohexene carboxaldehyde, citral, citrolellol   RUBBER CHEMICALS        # Substance 2 days 4 days remarks     Carbamate      124 1 Zinc Bis ( Diethyldithio carbamate ) (ZDEC) - -    125 2 Zinc Bis (Dibutyldithiocarbamate) - -     3 1,3-Diphenylguanidine (DPG) - ++      Thiourea       4 Dibutylthiourea - -     5 Diphenyltiourea - -     6 Thiourea - -      Mercapto chemicals      130 7 Morpholinyl Mercaptobenzothiazole - -     8 L-Ehmuqoooez-7-Benzothiazyl-Sulfenamide - -     9 Dibenzothiazyl Disulfide - -      Thiuram chemicals       10 Dipentamethylenethiuram Disulfide - -     11 Tetraethylthiuram Disulfide (Disulfiram) - -    135 12 Tetramethylthiuram Disulfide - -     13 Tetramethyl Thiuram Monosulfide (TMTM) - -      4-Phenylenediamine derivatives       14 N-Isopropyl-N'-Phenyl-P-Phenylenediamine (IPPD) - -     15 Nykebipd-O-Muraidbtzuttomge (DPPD) - -      Various Rubber Additives       16 Hydroquinone Monobenzylether  - -    140 17 Hexamethylenetetramine - -     18 4,4'-Dihydroxybiphenyl - -     19 Cyclohexylthiophtalimide - -    143 20 N-Phenyl-B-Naphthylamine - -    OTHER PRODUCTS        # Substance Conc  % solv 2 days 4 days remarks    1 [employer] hand disinfection Semi-occlusive As is - -     2 [employer] hand disinfection Semi-occlusive As is - -       Results of patch tests:                          Interpretation:  - Negative                    A    = Allergic      (+) Erythema    TI   = Toxic/irritant   + E + Infiltration    RaP = Relevance at Present     ++ E/I + Papulovesicle   Rpr  = Relevance Previously     +++ E/I/P + Blister     nR   = No Relevance     Atopy Screen (Placed Nov 6, 2023)  No Substance Readings (15 min) Evaluation   POS Histamine 1mg/ml +/++    NEG NaCl 0.9% -      No Substance Readings (15 min) Evaluation   1 Alternaria alternata (tenuis)  ++    2 Cladosporium herbarum ++    3 Aspergillus fumigatus -    4 Penicillium notatum -    5 Dermatophagoides pteronyssinus +++    6 Dermatophagoides farinae +++    7 Dog epithelium (canis spp) -    8 Cat hair (cyndy catus) +    9 Cockroach   (Blatella americana & germanica) -    10 Grass mix midwest   (Fanta, Orchard, Redtop, Boom) -    11 Jeff grass (sorghum halepense) -    12 Weed mix   (common Cocklebur, Lamb s quarters, rough redroot Pigweed, Dock/Sorrel) -    13 Mug wort (artemisia vulgare) -    14 Ragweed giant/short (ambrosia spp) +/++    15 White birch (Betula papyrifera) -    16 Tree mix 1 (Pecan, Maple BHR, Oak RVW, american [place], black [place]) -    17 Red cedar (juniperus virginia) -    18 Tree mix 2   (white [place], river/red Birch, black [place], common [place], american Elm) -    19 Box elder/Maple mix (acer spp) -    20 [place] shagbark (carya ovata) -           Conclusion: clearly atopic with sensitization to seasonal molds, ragweed and dust mites     [] No relevant allergic reaction observed    [x] Allergic reaction diagnosed against following allergens:    Rubber chemicals: ++ carba mix and from there ++ Diphenyl-Guanidine  +/++ Bacitracine  +/++ Propolis  + PVP Iodine        Interpretation/ remarks:   Patient is atopic with partially irritant contact dermatitis, but also proven and relevant contact sensitization to rubber chemicals (carba mix) and also to the natural disinfectant Propolis and less PVP Iodine (patient has a  history for irritations with PVP Iodine). No overlap to iodinated contrast media    [x] Patient information given   [x] ACDS CAMP information's (# [id number], and [id number]) to following compounds: carba mix, Diphenyl Guanidine, Bacitracin, Propolis, PVP Iodine   [] General information's to following compounds: ......      Assessment & Plan:    ==> Final Diagnosis:   # Proven allergic contact dermatitis to rubber chemicals and disinfectants with recurrent hand dermatitis (occupational)  >> some irritant dermatitis with atopy as well  * chronic illness with exacerbation, progression, side effects from treatment    # atopic predisposition with  Perennial rhinitis with sensitization to dust mites  Seasonal aggravation in spring and fall (seasonal molds and ragweed)  * stable chronic illness      These conclusions are made at the best of one's knowledge and belief based on the provided evidence such as patient's history and allergy test results and they can change over time or can be incomplete because of missing information's.    ==> Treatment Plan:    >> follow the recommendations of the CAMP Mary and use only products and most of all gloves from the Mary (has to be provided by work place!)    >> Protopic oint from Monday to Friday and Clobetasole on Weekends  ___________________________    Staff: : provider    Follow-up in Derm-Allergy clinic if necessary  ___________________________    I spent a total of 25 minutes with Naomy Mark during today s  visit. This time was spent discussing all the individual test results, correlating them to the clinical relevance, counseling the patient and/or coordinating care. Moreover time was spent to install and explain the CAMP Mary from [employer] Contact [employer]ermatitis [employer] with the informations on the individual allergens and propositions of products that can be used. Please see Assessment and Plan for additional details.

## 2023-11-10 ENCOUNTER — OFFICE VISIT (OUTPATIENT)
Dept: ALLERGY | Facility: CLINIC | Age: 28
End: 2023-11-10
Payer: OTHER MISCELLANEOUS

## 2023-11-10 DIAGNOSIS — L30.1 DYSHIDROTIC HAND DERMATITIS: ICD-10-CM

## 2023-11-10 DIAGNOSIS — L20.89 OTHER ATOPIC DERMATITIS: Primary | ICD-10-CM

## 2023-11-10 DIAGNOSIS — Z91.09 HOUSE DUST MITE ALLERGY: ICD-10-CM

## 2023-11-10 DIAGNOSIS — Z88.9 ATOPY: ICD-10-CM

## 2023-11-10 DIAGNOSIS — L23.9 OCCUPATIONAL ALLERGIC CONTACT DERMATITIS: ICD-10-CM

## 2023-11-10 PROCEDURE — 99203 OFFICE O/P NEW LOW 30 MIN: CPT | Performed by: DERMATOLOGY

## 2023-11-10 RX ORDER — TACROLIMUS 1 MG/G
OINTMENT TOPICAL
Qty: 60 G | Refills: 3 | Status: SHIPPED | OUTPATIENT
Start: 2023-11-10 | End: 2023-12-09

## 2023-11-10 NOTE — LETTER
11/10/2023         RE: Naomy Mark  [address]  [address]  [facility] [address]        Dear Colleague,    Thank you for referring your patient, Naomy Mark, to the [facility] [place]. Please see a copy of my visit note below.    [facility] Dermato-allergology Note  Office visit  Encounter Date: Nov 10, 2023  ____________________________________________    CC: No chief complaint on file.      HPI:  (Nov 10, 2023)  Ms. Naomy Mark is a(n) 28 year old female who presents today as a return patient for allergy consultation  - Follow-up in Derm-Allergy clinic for 2nd readings of patch tests after 4 days   - otherwise feeling well in usual state of health    Physical exam:  General: In no acute distress, well-developed, well-nourished  Eyes: no conjunctivitis  ENT: no signs of rhinitis   Pulmonary: no wheezing or coughing  Skin:Focused examination of the skin on test sites was performed = see test results below    Earlier History and Allergy exams:  - patient here for patch tests that have been discussed with her previously (is Dermatology resident)    RESULTS & EVALUATION of PATCH TESTS    Nov 6, 2023 application of patch tests:    Patch test readings after     [x] 2 days, [] 3 days [x] 4 days, [] 5 days,  Other duration: ...    Standardized panels  [x] Standard panel (40 tests)  [x] Preservatives & Antimicrobials (31 tests)  [x] Emulsifiers & Additives (25 tests)   [x] Perfumes/Flavours & Plants (25 tests)  [] Hairdresser panel (12 tests)  [x] Rubber Chemicals (22 tests)  [] Plastics (26 tests)  [] Colorants/Dyes/Food additives (20 tests)  [] Metals (implants/dental) (24 tests)  [] Local anaesthetics/NSAIDs (13 tests)  [] Antibiotics & Antimycotics (14 tests)   [] Corticosteroids (15 tests)   [] Photopatch test (62 tests)   [] others: ...                         [x] Patient's own products: ...  DO NOT test if chemical or biological identity is  unknown!   always ask from patient the product information and safety sheets (MSDS)     STANDARD Series                                          # Substance 2 days 4 days remarks     1 Andrzej Mix [C] - -       2 Colophony - -       3  2-Mercaptobenzothiazole  - -       4 Methylisothiazolinone - -       5 Carba Mix - ++       6 Thiuram Mix [A] - -       7 Bisphenol A Epoxy Resin - -       8 A-Qzmd-Qczaynbjqbj-Formaldehyde Resin - -       9 Mercapto Mix [A] - -       10 Black Rubber Mix- PPD [B] - -       11 Potassium Dichromate  -  -       12 Balsam of [place] (Myroxylon Pereirae Resin) - -       13 Nickel Sulphate Hexahydrate - -       14 Mixed Dialkyl Thiourea - -       15 Paraben Mix [B] - -       16 Methyldibromo Glutaronitrile - -       17 Fragrance Mix 8% - -       18 2-Bromo-2-Nitropropane-1,3-Diol (Bronopol) CT - -       19 Lyral - -       20 Tixocortol-21- Pivalate CT - -       21 Diazolidinyl urea (Germall II) - -        22 Methyl Methacrylate - -       23 Cobalt (II) Chloride Hexahydrate - -       24 Fragrance Mix II  - -       25 Compositae Mix - -       26 Benzoyl Peroxide - -       27 Bacitracin - +/++       28 Formaldehyde - -       29 Methylchloroisothiazolinone / Methylisothiazolinone - -       30 Corticosteroid Mix CT - -       31 Sodium Lauryl Sulfate - -       32 Lanolin Alcohol - -       33 Turpentine - -       34 Cetylstearylalcohol - -       35 Chlorhexidine Dicluconate - -       36 Budesonide - -       37 Imidazolidinyl Urea  - -       38 Ethyl-2 Cyanoacrylate - -       39 Quaternium 15 (Dowicil 200) - -       40 Decyl Glucoside - -      PRESERVATIVES & ANTIMICROBIALS        # Substance 2 days 4 days remarks   41 1  1,2-Benzisothiazoline-3-One, Sodium Salt - -     2  1,3,5-Alec (2-Hydroxyethyl) - Hexahydrotriazine (Grotan BK) - -     3 1-Merfdmylymwmq-0-Nitro-1, 3-Propanediol NA NA     4  3, 4, 4' - Triclocarban - -    45 5 4 - Chloro - 3 - Cresol - -     6 4 - Chloro - 4 - Xylenol (PCMX) - -      7 7-Ethylbicyclooxazolidine (Bioban [id number]) - -     8 Benzalkonium Chloride CT - -     9 Benzyl Alcohol - -    50 10 Cetalkonium Chloride - -     11 Cetylpyrimidine Chloride  - -     12 Chloroacetamide - -     13 DMDM Hydantoin - -     14 Glutaraldehyde - -    55 15 Triclosan - -     16 Glyoxal Trimeric Dihydrate - -     17 Iodopropynyl Butylcarbamate - -     18 Octylisothiazoline - -     19 Bithionol CT NA NA    60 20 Bioban P 1487 (Nitrobutyl) Morpholine/(Ethylnitro-Trimethylene) Dimorpholine - -     21 Phenoxyethanol - -     22 Phenyl Salicylate - -     23 Povidone Iodine - +     24 Sodium Benzoate - -    65 25 Sodium Disulfite - -     26 Sorbic Acid - -     27 Thimerosal - -     28 Melamine Formaldehyde Resin - -     29 Ethylenediamine Dihydrochloride - -      Parabens      70 30 Butyl-P-Hydroxybenzoate - -     31 Ethyl-P-Hydroxybenzoate - -     32 Methyl-P-Hydroxybenzoate - -    73 33 Propyl-P-Hydroxybenzoate - -     EMULSIFIERS & ADDITIVES       # Substance 2 days 4 days remarks   74 1 Polyethylene Glycol-400 - -    75 2 Cocamidopropyl Betaine - -     3 Amerchol L101 - -     4 Propylene Glycol - -     5 Triethanolamine - -     6 Sorbitane Sesquiolate CT - -    80 7 Isopropylmyristate - -     8 Polysorbate 80 CT - -     9 Amidoamine   (Stearamidopropyl Dimethylamine) - -     10 Oleamidopropyl Dimethylamine - -     11 Lauryl Glucoside - -    85 12 Coconut Diethanolamide  - -     13 2-Hydroxy-4-Methoxy Benzophenone (Oxybenzone) - -     14 Benzophenone-4 (Sulisobenzon) NA NA     15 Propolis - +/++     16 Dexpanthenol - -    90 17 Abitol - -     18 Tert-Butylhydroquinone - -     19 Benzyl Salicylate - -     20 Dimethylaminopropylamin (DMPA) - -     21 Zinc Pyrithione (Zinc Omadine)  - -    95 22 Alec(Hydroxymethyl) Nitromethane  - -      Antioxidant       23 Dodecyl Gallate - -     24 Butylhydroxyanisole (BHA) - -     25 Butylhydroxytoluene (BHT) - -     26 Di-Alpha-Tocopherol (Vit E) - -    100 27 Propyl Gallate -  -     PERFUMES, FLAVORS & PLANTS        # Substance 2 days 4 days remarks   101 1 Benzyl Cinnamate - -     2 Di-Limonene (Dipentene) - -     3 Cananga Odorata (Rashard Wetzel) (I) - -     4 Lichen Acid Mix - -    105 5 Mentha Piperita Oil (Peppermint Oil) - -     6 Sesquiterpenelactone mix - -     7 Tea Tree Oil, Oxidized - -     8 Wood Tar Mix - (+)     9 Abietic Acid - -    110 10 Lavendula Angustifolia Oil (Lavender Oil) - -     11 Fragrance mix II CT * 14% - -      Fragrance Mix I       12 [place] Absolute - -     13 Eugenol - -     14 Geraniol - -    115 15 Hydroxycitronellal - -     16 Isoeugenol - -     17 Cinnamic Aldehyde - -     18 Cinnamic Alcohol  - -      Fragrance mix II       19 Citronellol - -    120 20 Alpha-Hexylcinnamic Aldehyde    - -     21 Citral - -     22 Farnesol - -    123 23 Coumarin - -    Hexylcinnamic aldehyde, Coumarin, Farnesol, Hydroxyisohexy3-cyclohexene carboxaldehyde, citral, citrolellol   RUBBER CHEMICALS        # Substance 2 days 4 days remarks     Carbamate      124 1 Zinc Bis ( Diethyldithio carbamate ) (ZDEC) - -    125 2 Zinc Bis (Dibutyldithiocarbamate) - -     3 1,3-Diphenylguanidine (DPG) - ++      Thiourea       4 Dibutylthiourea - -     5 Diphenyltiourea - -     6 Thiourea - -      Mercapto chemicals      130 7 Morpholinyl Mercaptobenzothiazole - -     8 C-Tbilnpitmp-6-Benzothiazyl-Sulfenamide - -     9 Dibenzothiazyl Disulfide - -      Thiuram chemicals       10 Dipentamethylenethiuram Disulfide - -     11 Tetraethylthiuram Disulfide (Disulfiram) - -    135 12 Tetramethylthiuram Disulfide - -     13 Tetramethyl Thiuram Monosulfide (TMTM) - -      4-Phenylenediamine derivatives       14 N-Isopropyl-N'-Phenyl-P-Phenylenediamine (IPPD) - -     15 [place]-U-Bsqxlubgummamwbm (DPPD) - -      Various Rubber Additives       16 Hydroquinone Monobenzylether  - -    140 17 Hexamethylenetetramine - -     18 4,4'-Dihydroxybiphenyl - -     19 Cyclohexylthiophtalimide - -    143 30  N-Phenyl-B-Naphthylamine - -    OTHER PRODUCTS        # Substance Conc  % solv 2 days 4 days remarks    1 [employer] hand disinfection Semi-occlusive As is - -     2 [employer] hand disinfection Semi-occlusive As is - -       Results of patch tests:                         Interpretation:  - Negative                    A    = Allergic      (+) Erythema    TI   = Toxic/irritant   + E + Infiltration    RaP = Relevance at Present     ++ E/I + Papulovesicle   Rpr  = Relevance Previously     +++ E/I/P + Blister     nR   = No Relevance     Atopy Screen (Placed Nov 6, 2023)  No Substance Readings (15 min) Evaluation   POS Histamine 1mg/ml +/++    NEG NaCl 0.9% -      No Substance Readings (15 min) Evaluation   1 Alternaria alternata (tenuis)  ++    2 Cladosporium herbarum ++    3 Aspergillus fumigatus -    4 Penicillium notatum -    5 Dermatophagoides pteronyssinus +++    6 Dermatophagoides farinae +++    7 Dog epithelium (canis spp) -    8 Cat hair (cyndy catus) +    9 Cockroach   (Blatella americana & germanica) -    10 Grass mix midwest   (Fanta, Orchard, Redtop, Boom) -    11 Jeff grass (sorghum halepense) -    12 Weed mix   (common Cocklebur, Lamb s quarters, rough redroot Pigweed, Dock/Sorrel) -    13 Mug wort (artemisia vulgare) -    14 Ragweed giant/short (ambrosia spp) +/++    15 White birch (Betula papyrifera) -    16 Tree mix 1 (Pecan, Maple BHR, Oak RVW, american [place], black [place]) -    17 Red cedar (juniperus virginia) -    18 Tree mix 2   (white [place], river/red Birch, black [place], common [place], american Elm) -    19 Box elder/Maple mix (acer spp) -    20 [place] shagbark (carya ovata) -           Conclusion: clearly atopic with sensitization to seasonal molds, ragweed and dust mites     [] No relevant allergic reaction observed    [x] Allergic reaction diagnosed against following allergens:    Rubber chemicals: ++ carba mix and from there ++ Diphenyl-Guanidine  +/++ Bacitracine  +/++ Propolis  + PVP  Iodine        Interpretation/ remarks:   Patient is atopic with partially irritant contact dermatitis, but also proven and relevant contact sensitization to rubber chemicals (carba mix) and also to the natural disinfectant Propolis and less PVP Iodine (patient has a history for irritations with PVP Iodine). No overlap to iodinated contrast media    [x] Patient information given   [x] ACDS CAMP information's (# [id number], and [id number]) to following compounds: carba mix, Diphenyl Guanidine, Bacitracin, Propolis, PVP Iodine   [] General information's to following compounds: ......      Assessment & Plan:    ==> Final Diagnosis:   # Proven allergic contact dermatitis to rubber chemicals and disinfectants with recurrent hand dermatitis (occupational)  >> some irritant dermatitis with atopy as well  * chronic illness with exacerbation, progression, side effects from treatment    # atopic predisposition with  Perennial rhinitis with sensitization to dust mites  Seasonal aggravation in spring and fall (seasonal molds and ragweed)  * stable chronic illness      These conclusions are made at the best of one's knowledge and belief based on the provided evidence such as patient's history and allergy test results and they can change over time or can be incomplete because of missing information's.    ==> Treatment Plan:    >> follow the recommendations of the CAMP Mary and use only products and most of all gloves from the Mary (has to be provided by work place!)    >> Protopic oint from Monday to Friday and Clobetasole on Weekends    ___________________________      {kkstHelen De[facility] Hospitalvolved:[id number]}: provider    Follow-up in Derm-Allergy clinic ***  ___________________________    I spent a total of *** minutes with Naomy Mark during today s  visit. This time was spent discussing all the individual test results, correlating them to the clinical relevance, counseling the patient and/or coordinating care and performing allergy tests  or procedures.        Again, thank you for allowing me to participate in the care of your patient.        Sincerely,        Nathan Petersen MD

## 2023-11-10 NOTE — NURSING NOTE
Chief Complaint   Patient presents with    Allergy Testing Followup     Patch testing day 5     Emily Renee RN

## 2023-11-10 NOTE — PATIENT INSTRUCTIONS
Allergic reaction diagnosed against following allergens:     Rubber chemicals: ++ carba mix and from there ++ Diphenyl-Guanidine  +/++ Bacitracine  +/++ Propolis  + PVP Iodine           Interpretation/ remarks:   Patient is atopic with partially irritant contact dermatitis, but also proven and relevant contact sensitization to rubber chemicals (carba mix) and also to the natural disinfectant Propolis and less PVP Iodine (patient has a history for irritations with PVP Iodine). No overlap to iodinated contrast media     [x] Patient information given                       [x] ACDS CAMP information's (# [id number], and [id number]) to following compounds: carba mix, Diphenyl Guanidine, Bacitracin, Propolis, PVP Iodine                       [] General information's to following compounds: ......        Assessment & Plan:     ==> Final Diagnosis:   # Proven allergic contact dermatitis to rubber chemicals and disinfectants with recurrent hand dermatitis (occupational)  >> some irritant dermatitis with atopy as well  * chronic illness with exacerbation, progression, side effects from treatment     # atopic predisposition with  Perennial rhinitis with sensitization to dust mites  Seasonal aggravation in spring and fall (seasonal molds and ragweed)  * stable chronic illness        These conclusions are made at the best of one's knowledge and belief based on the provided evidence such as patient's history and allergy test results and they can change over time or can be incomplete because of missing information's.     ==> Treatment Plan:     >> follow the recommendations of the CAMP [facility] and use only products and most of all gloves from the Mary (has to be provided by work place!)     >> Protopic oint from Monday to Friday and Clobetasole on Weekends

## 2023-12-08 ENCOUNTER — TRANSCRIBE ORDERS (OUTPATIENT)
Dept: ULTRASOUND IMAGING | Facility: CLINIC | Age: 28
End: 2023-12-08

## 2023-12-08 ENCOUNTER — OFFICE VISIT (OUTPATIENT)
Dept: OBGYN | Facility: CLINIC | Age: 28
End: 2023-12-08
Payer: COMMERCIAL

## 2023-12-08 VITALS
HEART RATE: 75 BPM | DIASTOLIC BLOOD PRESSURE: 70 MMHG | WEIGHT: 140 LBS | BODY MASS INDEX: 20.64 KG/M2 | TEMPERATURE: 97.5 F | SYSTOLIC BLOOD PRESSURE: 103 MMHG

## 2023-12-08 DIAGNOSIS — O26.90 PREGNANCY RELATED CONDITION, ANTEPARTUM: Primary | ICD-10-CM

## 2023-12-08 DIAGNOSIS — Z31.69 ENCOUNTER FOR PRECONCEPTION CONSULTATION: Primary | ICD-10-CM

## 2023-12-08 PROCEDURE — 99203 OFFICE O/P NEW LOW 30 MIN: CPT | Performed by: OBSTETRICS & GYNECOLOGY

## 2023-12-08 PROCEDURE — 86762 RUBELLA ANTIBODY: CPT | Performed by: OBSTETRICS & GYNECOLOGY

## 2023-12-08 PROCEDURE — 86787 VARICELLA-ZOSTER ANTIBODY: CPT | Performed by: OBSTETRICS & GYNECOLOGY

## 2023-12-08 PROCEDURE — 36415 COLL VENOUS BLD VENIPUNCTURE: CPT | Performed by: OBSTETRICS & GYNECOLOGY

## 2023-12-08 NOTE — PROGRESS NOTES
GYN Progress Note     CC: Pre-conception counseling     HISTORY OF PRESENT ILLNESS:    Naomy Mark is a 28 year old  who presents for preconception counseling. 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 Her partner is a resident at the  and they will be moving next year to [place] to start his fellowship. Her PMH includes migraine headaches and acne so she would like to discuss medication management in preporation for pregnancy. She recently stopped OCPs. She takes Propranolol for migraine prevention as well as Imitrex for abortive therapy as needed, still getting a few migraines every month.       OB HISTORY:  OB History    Para Term  AB Living   0 0 0 0 0 0   SAB IAB Ectopic Multiple Live Births   0 0 0 0 0            GYN HISTORY:  She reports regular monthly periods   Denies hx of abnormal pap smears, STIs   Last pap was NIL in 2022      PAST MEDICAL HISTORY:  Past Medical History:   Diagnosis Date    Acne     Anxiety     History of heavy periods     Migraine without aura and without status migrainosus, not intractable        PAST SURGICAL HISTORY:  History reviewed. No pertinent surgical history.       CURRENT MEDICATIONS:   Current Outpatient Medications   Medication Sig Dispense Refill    propranolol (INDERAL) 20 MG tablet Take 1 tablet (20 mg) by mouth 2 times daily 180 tablet 1    spironolactone (ALDACTONE) 100 MG tablet Take 1 tablet (100 mg) by mouth daily 90 tablet 3    SUMAtriptan (IMITREX) 50 MG tablet Take 1 tablet (50 mg) by mouth at onset of headache for migraine May repeat in 2 hours. Max 2 tablets/24 hours. 30 tablet 1            ALLERGIES:  Patient has no known allergies.         SOCIAL HISTORY:  Social History     Tobacco Use    Smoking status: Never    Smokeless tobacco: Never   Vaping Use    Vaping Use: Never used   Substance Use Topics    Alcohol use: Yes     Comment: 2-3 drinks per  week    Drug use: Never            FAMILY HISTORY:  Family History   Problem Relation Age of Onset    Retinal detachment Mother     No Known Problems Father     No Known Problems Sister     No Known Problems Brother     Cerebrovascular Disease Maternal Grandmother 70    Hypertension Maternal Grandmother     Lymphoma Maternal Grandfather         NHL    No Known Problems Paternal Grandmother     ALS Paternal Grandfather 65    Diabetes No family hx of     Heart Disease No family hx of     Breast Cancer No family hx of     Colon Cancer No family hx of     Ovarian Cancer No family hx of     Glaucoma No family hx of     Macular Degeneration No family hx of             REVIEW OF SYSTEMS:  See HPI        PHYSICAL EXAMINATION:  VS:/70   Pulse 75   Temp 97.5  F (36.4  C)   Wt 63.5 kg (140 lb)   LMP 2023   BMI 20.64 kg/m    Body mass index is 20.64 kg/m .    General: Patient alert and oriented, no acute distress  CV: no peripheral edema or cyanosis  Resp: normal respiratory effort and equal lung expansion  Ext: non-tender, no edema          Laboratory values:  Imaging findings:        ASSESSMENT:  Naomy Mark is a 28 year old  who presents for preconception evaluation     PLAN:  (Z31.69) Encounter for preconception consultation  (primary encounter diagnosis)  -The patient presents today to discuss preconception in an effort to optimize her fertility outcomes. We spent time today discussing preparation for pregnancy.    -We reviewed the need for adequate nutrition in pregnancy to promote optimal fetal growth and provide for maternal needs. We discussed the importance of supplementing a good, healthy diet with folic acid for prevention of neural tube defects. The patient was counseled that taking 400 micrograms of folic acid daily for at least 1 month before pregnancy and through pregnancy may help prevent major birth defects of the baby s brain and spine.     -We reviewed the patient's other  medical problems that will contribute to her care during pregnancy including acne, anxiety and migraines. She is currently on Spironolactone and is aware that this medication is teratogenic. We discussed stopping this one month before she attempts to conceive. Topical salicylic acid and benzoyl peroxide products and topical antibiotics like clindamycin would be preferred treatment for acne in pregnancy if needed. She is currently taking Propranolol for migraine prophylaxis and we discussed that while there are some concerns about potential risk of FGR as well as  hypoglycemia, that it is the preferred medication in pregnancy and could be continued if needed. We would plan to monitor fetal growth during the pregnancy and would also try to stop prior to delivery to reduce the risk of  hypoglycemia if possible. We also discussed that migraines do improve for some women so she could also trial stopping the medication. We also reviewed safe medications to take if she does get a migraine in pregnancy with tylenol, moderate amount of caffeine, magnesium, benadryl and Reglan being the preferred medications. Limited NSAID use during the 2nd trimester would also be an option if necessary. We also reviewed the risks of sumatriptans in pregnancy and that these may be used if necessary and the benefits are felt to outweigh the risks but that other medications are preferred.      -With regard to optimization of fertility, we reviewed attempting conception without intervention for 3-6 months. The patient was counseled on how to track her menses and monitor her window of optimal fertility. If the couple has not yet conceived after 3-6 months, the patient was counseled on utilization of ovulation predictor strips to monitor ovulation. The patient was counseled on intercourse daily or every other day during her fertile window. We discussed intervention and investigation if she and her partner have not conceived within  12 months of attempting. We reviewed that it would be premature prior to this time to investigate concerns of infertility and the patient verbalized her understanding.    -I also recommended verifying her Rubella and Varicella immunity and that she complete her COVID and flu vaccines which she will do next week. We discussed the option for pre-conception carrier screening and the referral to genetic counseling was placed.     Plan: Rubella Antibody IgG, Varicella Zoster Virus         Antibody IgG, Mat Fetal [facility] Referral -         Preconception    Dispo: RTC as needed   Carli Walker MD

## 2023-12-11 LAB
RUBV IGG SERPL QL IA: 2.23 INDEX
RUBV IGG SERPL QL IA: POSITIVE
VZV IGG SER QL IA: 103.1 INDEX
VZV IGG SER QL IA: NORMAL

## 2023-12-15 ENCOUNTER — LAB (OUTPATIENT)
Dept: LAB | Facility: CLINIC | Age: 28
End: 2023-12-15
Attending: OBSTETRICS & GYNECOLOGY
Payer: COMMERCIAL

## 2023-12-15 ENCOUNTER — MEDICAL CORRESPONDENCE (OUTPATIENT)
Dept: HEALTH INFORMATION MANAGEMENT | Facility: CLINIC | Age: 28
End: 2023-12-15

## 2023-12-15 ENCOUNTER — OFFICE VISIT (OUTPATIENT)
Dept: MATERNAL FETAL MEDICINE | Facility: CLINIC | Age: 28
End: 2023-12-15
Attending: OBSTETRICS & GYNECOLOGY
Payer: COMMERCIAL

## 2023-12-15 DIAGNOSIS — Z31.430 ENCOUNTER OF FEMALE FOR TESTING FOR GENETIC DISEASE CARRIER STATUS FOR PROCREATIVE MANAGEMENT: ICD-10-CM

## 2023-12-15 DIAGNOSIS — O26.90 PREGNANCY RELATED CONDITION, ANTEPARTUM: ICD-10-CM

## 2023-12-15 DIAGNOSIS — Z31.430 ENCOUNTER OF FEMALE FOR TESTING FOR GENETIC DISEASE CARRIER STATUS FOR PROCREATIVE MANAGEMENT: Primary | ICD-10-CM

## 2023-12-15 PROCEDURE — 96040 HC GENETIC COUNSELING, EACH 30 MINUTES: CPT

## 2023-12-15 PROCEDURE — 36415 COLL VENOUS BLD VENIPUNCTURE: CPT

## 2023-12-15 NOTE — PROGRESS NOTES
"[facility] [place]  Genetic Counseling Consult    Patient:  Naomy Mark YOB: 1995   Date of Service:  12/15/23   MRN: [id number]    Naomy was seen at the [facility] [place] for preconception genetic consultation. The indication for genetic counseling is desire to discuss carrier screening . The patient was unaccompanied to this visit.     The session was conducted in English.      IMPRESSION/ PLAN   During today's [facility] visit, Naomy had a genetic counseling session and blood draw for carrier screening. Results are expected in 2-3 weeks. The patient will be called with results and if they do not answer they requested a detailed message with results on their voicemail.Patient was informed that results will be available in [employer].     PREGNANCY HISTORY   /Parity:        MEDICAL HISTORY   Naomy has a history of migraines, for which she takes propranolol and imitrex. She met with Dr. Carli Walker on 2023 for a preconception consultation. Please see her note for management recommendations.    FAMILY HISTORY   A three-generation pedigree was obtained today and is scanned under the \"Media\" tab in Epic. The family history was reported by Naomy.    The following significant findings were reported today:   Naomy's paternal first cousin has autism spectrum disorder.  Autism spectrum disorders (ASD) are characterized by abnormalities in language and social cognition. Individuals with ASD typically have difficulty with communication and interaction with others, intellectual disability, restricted interests, and repetitive behaviors. The cause of ASD is currently unknown. In less than 1% of all individuals with ASD, it is a feature of a certain genetic condition such as Down syndrome, fragile X syndrome, or Rett syndrome. However, in most cases the cause may be multifactorial which means a combination of genetic and " environmental factors. The genetic causes or predispositions are being researched and many have been suggested. Currently, family and twin studies are most useful to provide recurrence risks to individuals with a family history of ASD. Given the degree of relation (fourth degree to a future pregnancy), the risk is likely not elevated above the general population's.  Naomy's paternal grandfather had amyotrophic lateral sclerosis (ALS) and passed in his 60s.  We discussed the family history of ALS, which is a disease characterized by atrophy, or death, of the motor neurons. This leads to muscle weakness, loss of muscle mass, and inability to control movement. There are multiple types of ALS, differing in their signs and symptoms and whether they have a clear genetic association or not. Most cases of ALS are sporadic with no family history. Five to ten percent of cases of ALS have a family history and/or a related condition called frontotemporal dementia (FTD). FTD causes changes to personality, behavior, and language. There are several pathogenic, or disease-causing, variants in genes that can cause familial ALS. The largest percentage of cases are  accounted for by changes in the C5rmq44 gene. Changes in this gene account for 30-40% of cases of ALS. It is unknown whether Naomy's grandfather had a genetic change associated with their ALS. If other relatives are affected, I encouraged Naomy to reach back out to me to determine if a neurology referral is appropriate.     Otherwise, the reported family history is unremarkable for multiple miscarriages, stillbirths, birth defects, intellectual disabilities, known genetic conditions, and consanguinity.       RISK ASSESSMENT FOR CHROMOSOME CONDITIONS   We explained that the risk for fetal chromosome abnormalities increases with maternal age. We discussed specific features of common chromosome abnormalities, including Down syndrome, trisomy 13, trisomy 18, and sex chromosome  trisomies.    At age 29 at delivery in a future pregnancy, the risk to have a baby with Down syndrome is 1 in 597.  At age 29 at delivery in a future pregnancy, the risk to have a baby with any chromosome abnormality is 1 in 299.     Age-related risks were not discussed in detail today. Non-invasive prenatal screening can be reviewed in a future pregnancy.    RISK ASSESSMENT INHERITED CONDITIONS   We discussed that every pregnancy has a chance to have an inherited single-gene condition, even when there is no family history of that condition. In fact, approximately 90% of couples at an increased reproductive risk for an inherited condition have no family history of that condition. The average person may be a carrier for 5-10 different genetic variants that can increase the chance for their pregnancies to have that condition. We discussed autosomal recessive conditions and X-linked conditions. Autosomal recessive conditions happen when a mutation has been inherited from the egg and sperm and include conditions like cystic fibrosis, thalassemia, hearing loss, spinal muscular atrophy, and more. X-linked conditions happen when a mutation has been inherited from the egg and include conditions like fragile X syndrome.     We reviewed that when both biological parents carry a harmful genetic change in a gene associated with autosomal recessive inheritance, each of their pregnancies has a 1 in 4 (25%) chance to be affected by that condition. With x-linked conditions, the specific risk generally depends on the chromosomal sex of the fetus, with XY individuals (generally male) being most severely affected.     The patient does NOT have a family history of known inherited conditions. This does NOT mean the patient and/or their partner is not a carrier of a condition. Approximately 90% of couples at an increased reproductive risk for an inherited condition have no family history of that condition. The patient has not had carrier  screening previously.   The patient elected to pursue carrier screening today. The screening will include 558 genes, including fragile X syndrome, through [employer] See below for the more detailed information we discussed.     We discussed that expanded carrier screening is designed to identify carrier status for conditions that are primarily childhood or adolescent onset. Expanded carrier screening does not evaluate for adult-onset conditions such as hereditary cancer syndromes, dementia/ Alzheimer's disease, or cardiovascular disease risk factors. Additionally, expanded carrier screening is not comprehensive for all known genetic diseases or inherited conditions. It will not intentionally screen for autosomal dominant conditions. This is a screening test, and residual carrier status risk figures will be provided to the patient after results become available. Carrier screening is not meant to diagnose the patient with a condition, and generally carriers are asymptomatic. However, certain genes may confer increased risks for various health concerns in carriers (including, but not limited to: NITA, DMD, FMR1).    We discussed that carrier screening can have implications for other family members and that couples are encouraged to share positive results with siblings and other family members of reproductive age. Additionally, even if there is not a high reproductive risk for a condition, it is possible that carrier status can be passed on to future generations.    We reviewed that there is a law in place, the Genetic Information Nondiscrimination Act (PIYUSH), that protects patients from discrimination by health insurance companies and employers based on their genetic information. PIYUSH does not protect against discrimination by life insurance companies or disability insurance.    We reviewed that carrier screening will report on variants classified by the lab as pathogenic or likely pathogenic. Although  carrier status does not change over time, it is possible that a variant could be reclassified as more information about the variant is learned. If this occurs, the couple will be contacted and a new risk assessment will be provided.     Carrier screening will be performed by a lab called [email or web address] ([email or web address]). [facility] will bill your insurance. [facility] is not responsible for this billing. Upon receiving the sample, while the test is running, [facility] will process your insurance. They will initiate a prior authorization if needed.    [facility] billing will reach out to you if the cost of the test is more than $100. You may hear from them after your test has already resulted. [facility] billing will assist you in exploring options to reduce the cost such as the patient assistance program. You can see if you qualify by following this link, [facility] patient assitance program and scroll down to the tool that lets you add in household members, income etc. If someone does not quality for the program, [facility] may offer payment plans. Any payment you make to [facility] will go towards your insurance deductible and out of pocket.      Per her request, I contacted Naomy's insurance (reference [id number]). I informed Naomy that I could not guarantee the following, but her insurance company informed me that CPT 58297 and 40502 (comprehensive carrier screening is covered when medically necessary at 80% after deductible is met. Naomy has met her individual deductible of $400.    For more information, questions, or concerns, you can always email [email or web address] or call [phone] if you have more questions. Genetic counselors will have limited ability to help with billing, therefore, it is better to contact [facility] directly.      GENETIC TESTING OPTIONS   Genetic testing during a pregnancy includes screening and diagnostic procedures.      Screening tests are non-invasive which means no risk to the pregnancy and includes  ultrasounds and blood work. The benefits and limitations of screening were reviewed. Screening tests provide a risk assessment (chance) specific to the pregnancy for certain fetal chromosome abnormalities but cannot definitively diagnose or exclude a fetal chromosome abnormality. Follow-up genetic counseling and consideration of diagnostic testing is recommended with any abnormal screening result. Diagnostic testing during a pregnancy is more certain and can test for more conditions. However, the tests do have a risk of miscarriage that requires careful consideration. These tests can detect fetal chromosome abnormalities with greater than 99% certainty. Results can be compromised by maternal cell contamination or mosaicism and are limited by the resolution of current genetic testing technology.     There is no screening or diagnostic test that detects all forms of birth defects or intellectual disability.     We discussed the following screening options:   Carrier screening  Risk assessment for certain autosomal recessive and x-linked conditions. These conditions are generally infantile- or childhood-onset conditions.   Can be done any time during the pregnancy or prior to pregnancy.  Can screen for over 500 different genetic conditions.  Is not intended to diagnosis a condition in the carrier parent.    Even with negative results, a residual risk for screened conditions remains.        If Naomy and Pete are found to be carriers of any of the same autosomal recessive conditions, or if Naomy is a carrier of an X-linked condition, I will speak with them about reproductive options, including assisted reproduction with preimplantation genetic screening and diagnostic testing in an ongoing pregnancy.    It was a pleasure to be involved with Naomy s care. Face-to-face time of the meeting was 30 minutes.    Edin Figueroa MS, [facility]  Licensed Genetic Counselor  [facility]  Maternal Fetal Medicine  Office:  [phone]  Pager: [phone]  MF: [phone]   Fax: [phone]  [facility]

## 2023-12-26 ENCOUNTER — TELEPHONE (OUTPATIENT)
Dept: MATERNAL FETAL MEDICINE | Facility: CLINIC | Age: 28
End: 2023-12-26
Payer: COMMERCIAL

## 2023-12-26 LAB — SCANNED LAB RESULT: NORMAL

## 2023-12-26 NOTE — TELEPHONE ENCOUNTER
Carrier Screening Results Disclosure  [facility]    I spoke with Naomy today to review her carrier screening results (558 gene panel through [employer]). Her partner, Pete, has not yet had carrier screening. Naomy previously signed an authorization to share protected information form to discuss his results with him.    Naomy screened negative for all 558 conditions.    Most of the conditions on the carrier screening panel are inherited in an autosomal recessive fashion. Every individual has two copies of the gene that is responsible for this condition, and if someone has a change or mutation that impacts how one copy of the gene functions, they are called a carrier for the condition.  If someone has two copies of the gene that have a harmful change, they are affected with the condition.  If two people who are carriers for the same condition have children, there is a chance for their children to be affected.  Each parent has a 50% chance for passing on their copy of the gene with a mutation, so there is a 25% chance for each pregnancy to get two copies of the mutation and be affected, a 50% chance for each pregnancy to be an unaffected carrier, and a 25% chance to be unaffected with two normal copies of the gene.    Reproductive Risks   Residual risk is defined as the remaining chance of an event after negative screening. With a negative screen, some residual risk remains. However, that risk is low and screening for Pete is not necessary. Even if Pete were a carrier of any of the conditions on the panel, the risk to a future pregnancy for autosomal recessive screened diseases is very low.       Other Results of Note   For Naomy's screening, normal triplet repeats (CGG nucleotides) were observed in the FMR1 gene at lengths of 29 and 30 (normal: <45 CGG repeats; intermediate carrier: 45-54 CGG repeats; premutation carrier:  CGG repeats; full mutation/Fragile X Syndrome: >200 CGG  repeats). Given that Naomy has normal repeat lengths, she is not expected to be a carrier of Fragile X Syndrome.     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 Although this variant has been associated with late-onset Stargardt disease, the majority of individuals that are homozygous for this variant or compound heterozygous for this variant and another pathogenic variant are unaffected. It may be considered a disease-modifier, but carrier screening for the reproductive partner is not recommended.      No further screening or testing is recommended at this time. I encouraged Naomy to contact me with any questions.      Edin Figueroa MS, [facility]  Certified and Licensed Genetic Counselor  [facility]  Maternal Fetal Medicine  Office: [phone]  Pager: [phone]  [facility]: [phone]   Fax: [phone]  [facility]

## 2023-12-28 ENCOUNTER — OFFICE VISIT (OUTPATIENT)
Dept: DERMATOLOGY | Facility: CLINIC | Age: 28
End: 2023-12-28
Payer: COMMERCIAL

## 2023-12-28 DIAGNOSIS — M62.89: ICD-10-CM

## 2023-12-28 DIAGNOSIS — F45.8 BRUXISM (TEETH GRINDING): Primary | ICD-10-CM

## 2023-12-28 PROCEDURE — 99207 PR NO CHARGE LOS: CPT | Performed by: DERMATOLOGY

## 2023-12-28 NOTE — LETTER
12/28/2023         RE: Naomy Mark  [address]  [address]  [place] [address]        Dear Colleague,    Thank you for referring your patient, Naomy Mark, to the [facility]. Please see a copy of my visit note below.    Botulinum Injection Procedure Note:  Cosmetic      Dermatology Problem List:        SURGEON (S): Dr. Waldrop     NURSE: N/A     ANESTHESIA:   None    PREOPERATIVE DIAGNOSIS:   Crow's feet and Rhytides    LOCATION: [place]    LOT NO: [id number]    EXP DATE:1/31/2025      OPERATION/PROCEDURE:   Intralesional botulinum toxin injection     Dilution with 3ml preserved sterile normal saline in a 300Unit Dysport Vial.    Total units of botulinum toxin: 100u     POSTOPERATIVE DIAGNOSIS:   SAME     PREPARATION:   Alcohol swab    DESCRIPTION OF OPERATION/PROCEDURE:   The nature and purpose of the procedure, associated risks including but not limited to bruising, headache or discomfort at the site(s), numbness, muscle twitching, brow or eyelid droop, headache, double vision, not enough effect or too much effect, difficulty whistling or drinking from a straw, loss of muscle tone, or infection. Possible consequences and complications, and alternative methods of treatment were explained in detail. The patient declined a personal or family history of neuromuscular disease prior to the procedure. The patient is not pregnant or breast feeding.    A signed informed operative consent was obtained.    The patient was taken to the operative suite and properly positioned. The area to be treated was defined and confirmed by the patient and physician. The area for Botox injection was marked.    Cosmetic procedure: Injections were performed. The patient tolerated the procedure well and there were no complications noted.         Clinical Follow-Up: 2-3 weeks by phone if needed and otherwise 3-6 months for repeat botox      Staff Involved:  Resident/Staff     Staff Physician  Comments:   I saw and evaluated the patient with the resident (Dr. Josr Goldberg, plastics) and I agree with the assessment and plan. I was present for the entire minor procedure and examination.    No charge. Patient volunteered for resident cosmetic training. Donated products were used.    Luis Waldrop DO    Department of Dermatology  [facility] [place] [facility] [place]: Phone: [phone], Fax:[phone]  [facility] [place]: Phone: [phone], Fax: [phone]      Again, thank you for allowing me to participate in the care of your patient.        Sincerely,        Luis Waldrop MD

## 2024-03-28 ENCOUNTER — MYC MEDICAL ADVICE (OUTPATIENT)
Dept: NEUROLOGY | Facility: CLINIC | Age: 29
End: 2024-03-28
Payer: COMMERCIAL

## 2024-03-28 ENCOUNTER — TELEPHONE (OUTPATIENT)
Dept: NEUROLOGY | Facility: CLINIC | Age: 29
End: 2024-03-28
Payer: COMMERCIAL

## 2024-03-28 DIAGNOSIS — G43.009 MIGRAINE WITHOUT AURA AND WITHOUT STATUS MIGRAINOSUS, NOT INTRACTABLE: ICD-10-CM

## 2024-03-28 NOTE — TELEPHONE ENCOUNTER
Medication: propranolol (INDERAL) 20 MG tablet   Sig: Take 1 tablet (20 mg) by mouth 2 times daily   Date last written: 06/02/2023  Dispensed amount: 180 Tabs  Refills: 1    Requested Pharmacy:  [address] 2-[address]     Pt's last office visit: 06/02/2023  Next scheduled office visit:       Per the RN/LPN medication refill protocol, writer is unable to refill this request.       JULIO Lui., MOHIT ([facility])

## 2024-03-28 NOTE — TELEPHONE ENCOUNTER
Medication: SUMAtriptan (IMITREX) 50 MG tablet   Sig: Take 1 tablet (50 mg) by mouth at onset of headache for migraine May repeat in 2 hours. Max 2 tablets/24 hours.   Date last written: 06/02/2023  Dispensed amount: 30 Tabs  Refills: 1    Requested Pharmacy:  [address] [id number]     Pt's last office visit: 06/*02/2023  Next scheduled office visit:       Per the RN/LPN medication refill protocol, writer is unable to refill this request.     JULIO Lui., MOHIT ([facility])

## 2024-03-28 NOTE — TELEPHONE ENCOUNTER
Called and left message to schedule virtual visit with Dr. Loaiza for patient. Also sent a [id number] message.

## 2024-03-29 RX ORDER — ONDANSETRON 4 MG/1
4 TABLET, ORALLY DISINTEGRATING ORAL EVERY 8 HOURS PRN
Qty: 10 TABLET | Refills: 5 | Status: SHIPPED | OUTPATIENT
Start: 2024-03-29 | End: 2024-05-31

## 2024-03-29 RX ORDER — PROPRANOLOL HYDROCHLORIDE 20 MG/1
20 TABLET ORAL 2 TIMES DAILY
Qty: 180 TABLET | Refills: 3 | Status: SHIPPED | OUTPATIENT
Start: 2024-03-29

## 2024-03-29 RX ORDER — SUMATRIPTAN 50 MG/1
50 TABLET, FILM COATED ORAL
Qty: 12 TABLET | Refills: 5 | Status: SHIPPED | OUTPATIENT
Start: 2024-03-29 | End: 2024-05-31

## 2024-05-10 ENCOUNTER — OFFICE VISIT (OUTPATIENT)
Dept: MIDWIFE SERVICES | Facility: CLINIC | Age: 29
End: 2024-05-10
Attending: ADVANCED PRACTICE MIDWIFE
Payer: COMMERCIAL

## 2024-05-10 VITALS
TEMPERATURE: 97.4 F | HEART RATE: 66 BPM | WEIGHT: 140.5 LBS | SYSTOLIC BLOOD PRESSURE: 106 MMHG | OXYGEN SATURATION: 99 % | BODY MASS INDEX: 20.72 KG/M2 | DIASTOLIC BLOOD PRESSURE: 73 MMHG

## 2024-05-10 DIAGNOSIS — N91.1 SECONDARY AMENORRHEA: Primary | ICD-10-CM

## 2024-05-10 LAB — HBA1C MFR BLD: 4.9 % (ref 0–5.6)

## 2024-05-10 PROCEDURE — 84443 ASSAY THYROID STIM HORMONE: CPT | Performed by: ADVANCED PRACTICE MIDWIFE

## 2024-05-10 PROCEDURE — 36415 COLL VENOUS BLD VENIPUNCTURE: CPT | Performed by: ADVANCED PRACTICE MIDWIFE

## 2024-05-10 PROCEDURE — 99213 OFFICE O/P EST LOW 20 MIN: CPT | Performed by: ADVANCED PRACTICE MIDWIFE

## 2024-05-10 PROCEDURE — 83036 HEMOGLOBIN GLYCOSYLATED A1C: CPT | Performed by: ADVANCED PRACTICE MIDWIFE

## 2024-05-10 PROCEDURE — 84146 ASSAY OF PROLACTIN: CPT | Performed by: ADVANCED PRACTICE MIDWIFE

## 2024-05-10 PROCEDURE — 99459 PELVIC EXAMINATION: CPT | Performed by: ADVANCED PRACTICE MIDWIFE

## 2024-05-10 NOTE — PROGRESS NOTES
S:  Naomy Mark is a 28 year old  who presents today for secondary amenorrhea. She stopped birth control pills in anticipation for pregnancy 3 months ago. Her period has not returned. She has tried tracking ovulation but has not ovulated, did get the blinking smile face noting high fertility but never ovulation. She has taken pregnancy tests and they are negative. She is unsure about her period history. She has been on birth control since she was 11 years old, first pills, then IUDs, and then pills again. She has wondering if she has PCOS. She has had acne and more higher testosterone symptoms but has never had an ultrasound and has not been able to track her periods. She is wondering what to do next since it has been 3 months and she was told to follow up after that. She has been taking prenatals. She would like to have us look at her cervix, wondering if she has more cysts, had a nabothians cysts and feels like there are more now. Pap is up to date. No STI testing needed. She had a preconception visit already and working with [facility] on genetics. She desires to move forward quicker, ready to get pregnant.     O:  /73   Pulse 66   Temp 97.4  F (36.3  C) (Oral)   Wt 63.7 kg (140 lb 8 oz)   LMP  (LMP Unknown)   SpO2 99%   Breastfeeding No   BMI 20.72 kg/m     Patient is well   Pelvic exam: normal vagina and vulva, normal cervix without lesions or tenderness. Nabothians cysts at about 6 o'clock.     A:  Secondary Amenorrhea     P:  (N91.1) Secondary amenorrhea  (primary encounter diagnosis)  Comment: Discussed plans of ultrasound and follow up with the physicians so they can start a medications to trigger ovulation and periods. Discussed progesterone challenge versus ovulation meds versus both. Leaning towards both if needed.   Plan: Hemoglobin A1c, TSH with free T4 reflex,         Prolactin, US Transvaginal Pelvic Non-OB          MARGARITO Gómez CNM

## 2024-05-11 LAB
PROLACTIN SERPL 3RD IS-MCNC: 5 NG/ML (ref 5–23)
TSH SERPL DL<=0.005 MIU/L-ACNC: 1.7 UIU/ML (ref 0.3–4.2)

## 2024-05-31 ENCOUNTER — ANCILLARY PROCEDURE (OUTPATIENT)
Dept: ULTRASOUND IMAGING | Facility: CLINIC | Age: 29
End: 2024-05-31
Attending: ADVANCED PRACTICE MIDWIFE
Payer: COMMERCIAL

## 2024-05-31 ENCOUNTER — VIRTUAL VISIT (OUTPATIENT)
Dept: NEUROLOGY | Facility: CLINIC | Age: 29
End: 2024-05-31
Payer: COMMERCIAL

## 2024-05-31 DIAGNOSIS — G43.009 MIGRAINE WITHOUT AURA AND WITHOUT STATUS MIGRAINOSUS, NOT INTRACTABLE: ICD-10-CM

## 2024-05-31 DIAGNOSIS — N91.1 SECONDARY AMENORRHEA: ICD-10-CM

## 2024-05-31 PROCEDURE — 76830 TRANSVAGINAL US NON-OB: CPT | Mod: GC | Performed by: RADIOLOGY

## 2024-05-31 PROCEDURE — 99213 OFFICE O/P EST LOW 20 MIN: CPT | Mod: 95 | Performed by: PSYCHIATRY & NEUROLOGY

## 2024-05-31 RX ORDER — ONDANSETRON 4 MG/1
4 TABLET, ORALLY DISINTEGRATING ORAL EVERY 8 HOURS PRN
Qty: 10 TABLET | Refills: 11 | Status: SHIPPED | OUTPATIENT
Start: 2024-05-31

## 2024-05-31 RX ORDER — SUMATRIPTAN 50 MG/1
50 TABLET, FILM COATED ORAL
Qty: 12 TABLET | Refills: 11 | Status: SHIPPED | OUTPATIENT
Start: 2024-05-31

## 2024-05-31 NOTE — PROGRESS NOTES
Naomy is a 28 year old who is being evaluated via a billable video visit.    How would you like to obtain your AVS? MyChart  If the video visit is dropped, the invitation should be resent by: Send to e-mail at: [email or web address]  Will anyone else be joining your video visit? No    Video-Visit Details    Type of service:  Video Visit   Originating Location (pt. Location): Home    Distant Location (provider location):  On-site  Platform used for Video Visit: [facility]      NEUROLOGY PROGRESS NOTE   [facility]    Patient:Naomy Mark  : 1995  Age: 28 year old  Today's Virtual Visit: May 31, 2024    History of present illness:     Naomy is participating in this virtual visit for a follow-up on her migraine headaches.  She was last seen 2023.    Her headaches are currently less frequent, approximately once a month, compare to 2-5 times a month. Imitrex helps, usually she only takes one.  When she gets a headaches, the severity is usually 7 in scale of 1-10, and it may last the whole day, if she doesn't take sumatriptan; with sumatriptan, they last 4-5 hrs. She is taking propranolol 20 mg bid.     her headaches are most often one-sided, and occasionally bilateral. Occasionally has nausea.  It is a sharp pain. She denies an aura.  She has photosensitivity and phono sensitivity.  She denies vision changes, dizziness, tingling/numbness or weakness.      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 She is planning to get pregnant.     Current Outpatient Medications   Medication Sig Dispense Refill    ondansetron (ZOFRAN ODT) 4 MG ODT tab Take 1 tablet (4 mg) by mouth every 8 hours as needed for nausea 10 tablet 5    propranolol (INDERAL) 20 MG tablet Take 1 tablet (20 mg) by mouth 2 times daily 180 tablet 3    spironolactone (ALDACTONE) 100 MG tablet Take 1 tablet (100 mg) by mouth daily 90 tablet 3    SUMAtriptan (IMITREX) 50 MG tablet Take 1  tablet (50 mg) by mouth at onset of headache for migraine May repeat in 2 hours. Max 2 tablets/24 hours. 12 tablet 5     Exam:    LMP  (LMP Unknown)      Wt Readings from Last 5 Encounters:   05/10/24 63.7 kg (140 lb 8 oz)   23 63.5 kg (140 lb)   23 61.3 kg (135 lb 1.3 oz)   22 59.9 kg (132 lb)   22 60.2 kg (132 lb 11.2 oz)     Alert, awake, cooperative and pleasant, no aphasia or dysarthria, EOMI, face is symmetric, moves upper extremities against gravity    Assessment/Plan:    Common migraine headaches: Naomy has unilateral headaches, associated with nausea, photo/phonosensitivity.  Headaches frequency has decreased from approximately once a week to once a month since she started propranolol.  She takes sumatriptan as needed.      She is planning to get pregnant.  She may continue taking propranolol during pregnancy, except near term, as it may result in  bradycardia, hypotension, hypoglycaemia or respiratory distress.    - Continue taking propranolol 20 mg twice a day.     - Continue taking sumatriptan 50 mg as needed at onset of headache.  May repeat in 2 hours.          As described above, I met with the patient for 6 minutes and during this time counseling was greater than 50% of the visit time.  Sadaf Ruiz MD

## 2024-05-31 NOTE — LETTER
2024         RE: Naomy Mark  [address]  [address]  [place] [address]        Dear Colleague,    Thank you for referring your patient, Naomy Mark, to the [facility] [place]. Please see a copy of my visit note below.       NEUROLOGY PROGRESS NOTE   [facility]    Patient:Naomy Mark  : 1995  Age: 28 year old  Today's Virtual Visit: May 31, 2024    History of present illness:     Naomy is participating in this virtual visit for a follow-up on her migraine headaches.  She was last seen 2023.    Her headaches are currently less frequent, approximately once a month, compare to 2-5 times a month. Imitrex helps, usually she only takes one.  When she gets a headaches, the severity is usually 7 in scale of 1-10, and it may last the whole day, if she doesn't take sumatriptan; with sumatriptan, they last 4-5 hrs. She is taking propranolol 20 mg bid.     her headaches are most often one-sided, and occasionally bilateral. Occasionally has nausea.  It is a sharp pain. She denies an aura.  She has photosensitivity and phono sensitivity.  She denies vision changes, dizziness, tingling/numbness or weakness.      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 this year and is moving to [place]. She is planning to get pregnant.     Current Outpatient Medications   Medication Sig Dispense Refill    ondansetron (ZOFRAN ODT) 4 MG ODT tab Take 1 tablet (4 mg) by mouth every 8 hours as needed for nausea 10 tablet 5    propranolol (INDERAL) 20 MG tablet Take 1 tablet (20 mg) by mouth 2 times daily 180 tablet 3    spironolactone (ALDACTONE) 100 MG tablet Take 1 tablet (100 mg) by mouth daily 90 tablet 3    SUMAtriptan (IMITREX) 50 MG tablet Take 1 tablet (50 mg) by mouth at onset of headache for migraine May repeat in 2 hours. Max 2 tablets/24 hours. 12 tablet 5     Exam:    LMP  (LMP Unknown)      Wt Readings from Last 5 Encounters:   05/10/24  63.7 kg (140 lb 8 oz)   23 63.5 kg (140 lb)   23 61.3 kg (135 lb 1.3 oz)   22 59.9 kg (132 lb)   22 60.2 kg (132 lb 11.2 oz)     Alert, awake, cooperative and pleasant, no aphasia or dysarthria, EOMI, face is symmetric, moves upper extremities against gravity    Assessment/Plan:    Common migraine headaches: Naomy has unilateral headaches, associated with nausea, photo/phonosensitivity.  Headaches frequency has decreased from approximately once a week to once a month since she started propranolol.  She takes sumatriptan as needed.      She is planning to get pregnant.  She may continue taking propranolol during pregnancy, except near term, as it may result in  bradycardia, hypotension, hypoglycaemia or respiratory distress.    - Continue taking propranolol 20 mg twice a day.     - Continue taking sumatriptan 50 mg as needed at onset of headache.  May repeat in 2 hours.          As described above, I met with the patient for 6 minutes and during this time counseling was greater than 50% of the visit time.  Sadaf Ruiz MD

## 2024-06-07 DIAGNOSIS — Z31.69 ENCOUNTER FOR PRECONCEPTION CONSULTATION: Primary | ICD-10-CM

## 2024-06-24 ENCOUNTER — LAB (OUTPATIENT)
Dept: LAB | Facility: CLINIC | Age: 29
End: 2024-06-24
Payer: COMMERCIAL

## 2024-06-24 DIAGNOSIS — Z31.69 ENCOUNTER FOR PRECONCEPTION CONSULTATION: ICD-10-CM

## 2024-06-24 PROCEDURE — 86787 VARICELLA-ZOSTER ANTIBODY: CPT

## 2024-06-24 PROCEDURE — 36415 COLL VENOUS BLD VENIPUNCTURE: CPT

## 2024-06-25 LAB
VZV IGG SER QL IA: 3736 INDEX
VZV IGG SER QL IA: POSITIVE

## 2024-09-22 ENCOUNTER — HEALTH MAINTENANCE LETTER (OUTPATIENT)
Age: 29
End: 2024-09-22